# Patient Record
Sex: MALE | Race: BLACK OR AFRICAN AMERICAN | NOT HISPANIC OR LATINO | URBAN - METROPOLITAN AREA
[De-identification: names, ages, dates, MRNs, and addresses within clinical notes are randomized per-mention and may not be internally consistent; named-entity substitution may affect disease eponyms.]

---

## 2020-04-20 ENCOUNTER — HOSPITAL ENCOUNTER (INPATIENT)
Facility: HOSPITAL | Age: 55
LOS: 1 days | Discharge: HOME | DRG: 064 | End: 2020-04-22
Attending: EMERGENCY MEDICINE | Admitting: PSYCHIATRY & NEUROLOGY
Payer: OTHER GOVERNMENT

## 2020-04-20 DIAGNOSIS — I63.9 CEREBROVASCULAR ACCIDENT (CVA), UNSPECIFIED MECHANISM: Primary | ICD-10-CM

## 2020-04-20 DIAGNOSIS — I63.9 STROKE: ICD-10-CM

## 2020-04-20 PROBLEM — R29.818 TRANSIENT NEUROLOGICAL SYMPTOMS: Status: ACTIVE | Noted: 2020-04-20

## 2020-04-20 PROBLEM — E78.5 HYPERLIPIDEMIA: Status: ACTIVE | Noted: 2020-04-20

## 2020-04-20 PROBLEM — I63.50 RIGHT PONTINE STROKE: Status: ACTIVE | Noted: 2020-04-20

## 2020-04-20 PROBLEM — I10 ESSENTIAL HYPERTENSION: Status: ACTIVE | Noted: 2020-04-20

## 2020-04-20 LAB
ALBUMIN SERPL BCP-MCNC: 4.6 G/DL (ref 3.5–5.2)
ALP SERPL-CCNC: 206 U/L (ref 55–135)
ALT SERPL W/O P-5'-P-CCNC: 69 U/L (ref 10–44)
ANION GAP SERPL CALC-SCNC: 9 MMOL/L (ref 8–16)
AST SERPL-CCNC: 42 U/L (ref 10–40)
BASOPHILS # BLD AUTO: 0.03 K/UL (ref 0–0.2)
BASOPHILS NFR BLD: 0.6 % (ref 0–1.9)
BILIRUB SERPL-MCNC: 0.5 MG/DL (ref 0.1–1)
BILIRUB UR QL STRIP: NEGATIVE
BUN SERPL-MCNC: 13 MG/DL (ref 6–20)
CALCIUM SERPL-MCNC: 10.6 MG/DL (ref 8.7–10.5)
CHLORIDE SERPL-SCNC: 99 MMOL/L (ref 95–110)
CHOLEST SERPL-MCNC: 211 MG/DL (ref 120–199)
CHOLEST/HDLC SERPL: 4.2 {RATIO} (ref 2–5)
CLARITY UR REFRACT.AUTO: CLEAR
CO2 SERPL-SCNC: 30 MMOL/L (ref 23–29)
COLOR UR AUTO: YELLOW
CREAT SERPL-MCNC: 1.1 MG/DL (ref 0.5–1.4)
CREAT SERPL-MCNC: 1.2 MG/DL (ref 0.5–1.4)
DIFFERENTIAL METHOD: NORMAL
EOSINOPHIL # BLD AUTO: 0.1 K/UL (ref 0–0.5)
EOSINOPHIL NFR BLD: 1.4 % (ref 0–8)
ERYTHROCYTE [DISTWIDTH] IN BLOOD BY AUTOMATED COUNT: 12.7 % (ref 11.5–14.5)
EST. GFR  (AFRICAN AMERICAN): >60 ML/MIN/1.73 M^2
EST. GFR  (NON AFRICAN AMERICAN): >60 ML/MIN/1.73 M^2
ESTIMATED AVG GLUCOSE: 100 MG/DL (ref 68–131)
GLUCOSE SERPL-MCNC: 97 MG/DL (ref 70–110)
GLUCOSE UR QL STRIP: NEGATIVE
HBA1C MFR BLD HPLC: 5.1 % (ref 4–5.6)
HCT VFR BLD AUTO: 51.6 % (ref 40–54)
HDLC SERPL-MCNC: 50 MG/DL (ref 40–75)
HDLC SERPL: 23.7 % (ref 20–50)
HGB BLD-MCNC: 16.7 G/DL (ref 14–18)
HGB UR QL STRIP: NEGATIVE
IMM GRANULOCYTES # BLD AUTO: 0.01 K/UL (ref 0–0.04)
IMM GRANULOCYTES NFR BLD AUTO: 0.2 % (ref 0–0.5)
INR PPP: 1.1 (ref 0.8–1.2)
KETONES UR QL STRIP: NEGATIVE
LDLC SERPL CALC-MCNC: 146 MG/DL (ref 63–159)
LEUKOCYTE ESTERASE UR QL STRIP: NEGATIVE
LYMPHOCYTES # BLD AUTO: 1.6 K/UL (ref 1–4.8)
LYMPHOCYTES NFR BLD: 32.7 % (ref 18–48)
MCH RBC QN AUTO: 30.7 PG (ref 27–31)
MCHC RBC AUTO-ENTMCNC: 32.4 G/DL (ref 32–36)
MCV RBC AUTO: 95 FL (ref 82–98)
MONOCYTES # BLD AUTO: 0.5 K/UL (ref 0.3–1)
MONOCYTES NFR BLD: 10.2 % (ref 4–15)
NEUTROPHILS # BLD AUTO: 2.7 K/UL (ref 1.8–7.7)
NEUTROPHILS NFR BLD: 54.9 % (ref 38–73)
NITRITE UR QL STRIP: NEGATIVE
NONHDLC SERPL-MCNC: 161 MG/DL
NRBC BLD-RTO: 0 /100 WBC
PH UR STRIP: 6 [PH] (ref 5–8)
PLATELET # BLD AUTO: 176 K/UL (ref 150–350)
PMV BLD AUTO: 10.2 FL (ref 9.2–12.9)
POTASSIUM SERPL-SCNC: 4.3 MMOL/L (ref 3.5–5.1)
PROT SERPL-MCNC: 9.6 G/DL (ref 6–8.4)
PROT UR QL STRIP: NEGATIVE
PROTHROMBIN TIME: 10.9 SEC (ref 9–12.5)
RBC # BLD AUTO: 5.44 M/UL (ref 4.6–6.2)
SAMPLE: NORMAL
SARS-COV-2 RDRP RESP QL NAA+PROBE: NEGATIVE
SODIUM SERPL-SCNC: 138 MMOL/L (ref 136–145)
SP GR UR STRIP: 1.01 (ref 1–1.03)
TRIGL SERPL-MCNC: 75 MG/DL (ref 30–150)
TSH SERPL DL<=0.005 MIU/L-ACNC: 0.57 UIU/ML (ref 0.4–4)
URN SPEC COLLECT METH UR: NORMAL
WBC # BLD AUTO: 4.89 K/UL (ref 3.9–12.7)

## 2020-04-20 PROCEDURE — 99900035 HC TECH TIME PER 15 MIN (STAT)

## 2020-04-20 PROCEDURE — 84443 ASSAY THYROID STIM HORMONE: CPT

## 2020-04-20 PROCEDURE — 93010 ELECTROCARDIOGRAM REPORT: CPT | Mod: ,,, | Performed by: INTERNAL MEDICINE

## 2020-04-20 PROCEDURE — 99291 CRITICAL CARE FIRST HOUR: CPT | Mod: 25

## 2020-04-20 PROCEDURE — 80053 COMPREHEN METABOLIC PANEL: CPT

## 2020-04-20 PROCEDURE — G0378 HOSPITAL OBSERVATION PER HR: HCPCS

## 2020-04-20 PROCEDURE — 99285 PR EMERGENCY DEPT VISIT,LEVEL V: ICD-10-PCS | Mod: ,,, | Performed by: PSYCHIATRY & NEUROLOGY

## 2020-04-20 PROCEDURE — 93010 EKG 12-LEAD: ICD-10-PCS | Mod: ,,, | Performed by: INTERNAL MEDICINE

## 2020-04-20 PROCEDURE — 82962 GLUCOSE BLOOD TEST: CPT

## 2020-04-20 PROCEDURE — 99291 CRITICAL CARE FIRST HOUR: CPT | Mod: ,,, | Performed by: EMERGENCY MEDICINE

## 2020-04-20 PROCEDURE — 85610 PROTHROMBIN TIME: CPT

## 2020-04-20 PROCEDURE — 96372 THER/PROPH/DIAG INJ SC/IM: CPT

## 2020-04-20 PROCEDURE — 83036 HEMOGLOBIN GLYCOSYLATED A1C: CPT

## 2020-04-20 PROCEDURE — 82565 ASSAY OF CREATININE: CPT

## 2020-04-20 PROCEDURE — 99291 PR CRITICAL CARE, E/M 30-74 MINUTES: ICD-10-PCS | Mod: ,,, | Performed by: EMERGENCY MEDICINE

## 2020-04-20 PROCEDURE — 80061 LIPID PANEL: CPT

## 2020-04-20 PROCEDURE — 81003 URINALYSIS AUTO W/O SCOPE: CPT

## 2020-04-20 PROCEDURE — 63600175 PHARM REV CODE 636 W HCPCS: Performed by: STUDENT IN AN ORGANIZED HEALTH CARE EDUCATION/TRAINING PROGRAM

## 2020-04-20 PROCEDURE — 93005 ELECTROCARDIOGRAM TRACING: CPT

## 2020-04-20 PROCEDURE — U0002 COVID-19 LAB TEST NON-CDC: HCPCS

## 2020-04-20 PROCEDURE — 36000 PLACE NEEDLE IN VEIN: CPT

## 2020-04-20 PROCEDURE — 25000003 PHARM REV CODE 250: Performed by: STUDENT IN AN ORGANIZED HEALTH CARE EDUCATION/TRAINING PROGRAM

## 2020-04-20 PROCEDURE — 85025 COMPLETE CBC W/AUTO DIFF WBC: CPT

## 2020-04-20 PROCEDURE — 99285 EMERGENCY DEPT VISIT HI MDM: CPT | Mod: ,,, | Performed by: PSYCHIATRY & NEUROLOGY

## 2020-04-20 PROCEDURE — 36415 COLL VENOUS BLD VENIPUNCTURE: CPT

## 2020-04-20 RX ORDER — SODIUM CHLORIDE 0.9 % (FLUSH) 0.9 %
10 SYRINGE (ML) INJECTION
Status: DISCONTINUED | OUTPATIENT
Start: 2020-04-20 | End: 2020-04-22 | Stop reason: HOSPADM

## 2020-04-20 RX ORDER — ASPIRIN 81 MG/1
81 TABLET ORAL DAILY
Status: DISCONTINUED | OUTPATIENT
Start: 2020-04-20 | End: 2020-04-22 | Stop reason: HOSPADM

## 2020-04-20 RX ORDER — HEPARIN SODIUM 5000 [USP'U]/ML
5000 INJECTION, SOLUTION INTRAVENOUS; SUBCUTANEOUS EVERY 8 HOURS
Status: DISCONTINUED | OUTPATIENT
Start: 2020-04-20 | End: 2020-04-22 | Stop reason: HOSPADM

## 2020-04-20 RX ORDER — LABETALOL HYDROCHLORIDE 5 MG/ML
10 INJECTION, SOLUTION INTRAVENOUS
Status: DISCONTINUED | OUTPATIENT
Start: 2020-04-20 | End: 2020-04-20

## 2020-04-20 RX ORDER — CLOPIDOGREL BISULFATE 75 MG/1
75 TABLET ORAL DAILY
Status: DISCONTINUED | OUTPATIENT
Start: 2020-04-21 | End: 2020-04-22 | Stop reason: HOSPADM

## 2020-04-20 RX ORDER — ATORVASTATIN CALCIUM 10 MG/1
40 TABLET, FILM COATED ORAL DAILY
Status: DISCONTINUED | OUTPATIENT
Start: 2020-04-21 | End: 2020-04-20

## 2020-04-20 RX ORDER — ATORVASTATIN CALCIUM 40 MG/1
40 TABLET, FILM COATED ORAL DAILY
Status: DISCONTINUED | OUTPATIENT
Start: 2020-04-20 | End: 2020-04-22 | Stop reason: HOSPADM

## 2020-04-20 RX ORDER — CLOPIDOGREL 300 MG/1
300 TABLET, FILM COATED ORAL ONCE
Status: COMPLETED | OUTPATIENT
Start: 2020-04-20 | End: 2020-04-20

## 2020-04-20 RX ORDER — LABETALOL HCL 20 MG/4 ML
10 SYRINGE (ML) INTRAVENOUS
Status: DISCONTINUED | OUTPATIENT
Start: 2020-04-20 | End: 2020-04-22 | Stop reason: HOSPADM

## 2020-04-20 RX ADMIN — CLOPIDOGREL BISULFATE 300 MG: 300 TABLET, FILM COATED ORAL at 03:04

## 2020-04-20 RX ADMIN — ASPIRIN 81 MG: 81 TABLET, COATED ORAL at 03:04

## 2020-04-20 RX ADMIN — ATORVASTATIN CALCIUM 40 MG: 40 TABLET, FILM COATED ORAL at 03:04

## 2020-04-20 RX ADMIN — HEPARIN SODIUM 5000 UNITS: 5000 INJECTION INTRAVENOUS; SUBCUTANEOUS at 09:04

## 2020-04-20 NOTE — ASSESSMENT & PLAN NOTE
Stroke risk factor  -Likely hypertensive in response to infarct  -Permissive HTN to SBP <220 x24 hrs followed by gradual taper.

## 2020-04-20 NOTE — ASSESSMENT & PLAN NOTE
Mr. Tejeda is a 54 yo male w/ PMH significant for HTN who presented to [facility] [place]20.  Stroke code called for concern of LUE, LLE numbness and dysarthria.      Overall, most concerned for hypertensive emergency giving waxing/waning symptoms over the last few days, but symptoms he is currently experiencing have been present since he awoke at 0800 today.    VAN negative.  Not a candidate for rtPA given LKN.    Recommendations  -MRI Brain w/o contrast, MRA head/neck w/o  -Permissive HTN to SBP <220 pending MRI.      -IF MRI positive for stroke, will continue with permissive HTN to SBP <220 and possibly admit to stroke (if BP able to be controlled safely on floor).    -IF MRI negative for stroke, would recommend treating as hypertensive emergency.

## 2020-04-20 NOTE — ASSESSMENT & PLAN NOTE
Mr. Tejeda is a 56 yo male w/ PMH significant for HTN who [place] [facility]  Stroke code called for concern of LUE, LLE numbness and dysarthria.  Found to have R lateral pontine stroke.    Plan  Antithrombotics for secondary stroke prevention: DAPT x30 days then ASA 81 monotherapy there after    Statins for secondary stroke prevention and hyperlipidemia, if present:   START atorvastatin 40 mg Qdaily, LDL pending    Aggressive risk factor modification: HTN     Rehab efforts: The patient has been evaluated by a stroke team provider and the therapy needs have been fully considered based off the presenting complaints and exam findings. The following therapy evaluations are needed: PT evaluate and treat, OT evaluate and treat, SLP evaluate and treat, PM&R evaluate for appropriate placement    Diagnostics ordered/pending: HgbA1C to assess blood glucose levels, Lipid Profile to assess cholesterol levels, TTE to assess cardiac function/status     VTE prophylaxis: Heparin 5000 units SQ every 8 hours Place SCDs for mechanical ppx.    BP parameters: Infarct: No intervention, SBP <220    Admit to stroke floor pending COVID-19 testing.

## 2020-04-20 NOTE — NURSING
Pt arrived from ED via stretcher, pt is aaox4, pt's BP is 215/125, pt has no complaints of any headache or pain, NIH assessment performed, safety precautions maintained, call light in reach.

## 2020-04-20 NOTE — HPI
Mr. Tejeda is a 54 yo male w/ PMH significant for HTN who [place] [facility] [place].  Stroke code called for concern of LUE, LLE numbness and dysarthria.    Pt reports that he began having episodes of the above symptoms beginning 3 days (Saturday) prior to presentation.  States that he awoke with these symptoms at 0800 the day of presentation and wife encouraged him to come to ED for evaluation.  In ED, SBP in 220s.  He is not sure what his BP typically runs.  He has not been on any antihypertensives in 2 years, per pt report.  Drinks a beer or less most days after work ().  Denies cigarette use, recreational drug use.  Occasional ASA use for pain, does not take on a regular basis.

## 2020-04-20 NOTE — SUBJECTIVE & OBJECTIVE
No past medical history on file.  No past surgical history on file.  No family history on file.  Social History     Tobacco Use    Smoking status: Not on file   Substance Use Topics    Alcohol use: Not on file    Drug use: Not on file     Review of patient's allergies indicates:  Allergies not on file    Medications: I have reviewed the current medication administration record.      (Not in a hospital admission)    Review of Systems   Constitutional: Negative for fever.   HENT: Negative for facial swelling.    Eyes: Negative for redness.   Respiratory: Negative for cough.    Cardiovascular: Negative for leg swelling.   Gastrointestinal: Negative for vomiting.   Skin: Negative for rash.   Allergic/Immunologic: Negative for immunocompromised state.   Neurological: Positive for speech difficulty and numbness. Negative for facial asymmetry.   Psychiatric/Behavioral: Negative for confusion.     Objective:     Vital Signs (Most Recent):  Temp: 99.3 °F (37.4 °C) (04/20/20 1311)  Pulse: 94 (04/20/20 1315)  Resp: 18 (04/20/20 1311)  BP: (!) 222/127(Suhail at bedside aware) (04/20/20 1315)  SpO2: 97 % (04/20/20 1311)    Vital Signs Range (Last 24H):  Temp:  [99.3 °F (37.4 °C)]   Pulse:  [83-94]   Resp:  [18]   BP: (199-222)/(112-127)   SpO2:  [97 %]     Physical Exam   Constitutional: He is oriented to person, place, and time. He appears well-developed. No distress.   HENT:   Head: Normocephalic.   Eyes: Conjunctivae are normal.   Cardiovascular: Normal rate, regular rhythm and normal heart sounds. Exam reveals no friction rub.   No murmur heard.  Pulmonary/Chest: Effort normal and breath sounds normal. No respiratory distress. He has no wheezes.   Abdominal: Soft. Bowel sounds are normal. He exhibits no distension. There is no tenderness.   Musculoskeletal: He exhibits no edema.   Neurological: He is alert and oriented to person, place, and time.   Skin: Skin is warm and dry.   Psychiatric: He has a normal mood and  affect.       Neurological Exam:   LOC: alert  Language: slight receptive aphasia (missed hammock)  Articulation: Dysarthria (somewhat obscured by surgical mask)  Orientation: oriented to month/year, day.  Visual Fields: Full  EOM (CN III, IV, VI): Full/intact  Pupils (CN II, III): PERRL  Facial Sensation (CN V): Normal  Facial Movement (CN VII): Symmetric facial expression    Motor: 5/5 all extremities  Cebellar: finger/nose intact b/l  Sensation: Intact to light touch in all extremities, reports subjective tingling in LUE, LLE      Laboratory:  No results found for this or any previous visit (from the past 12 hour(s)).        Diagnostic Results:    Brain imagin20 CTH: Per independent resident review and interpretation,  No acute infarct, hemorrhage, nor mass effect.  Remote lacunar infarct L anterior thalamus.  Prominent L vert and basilar, suspect unlikely to be hyperdense vessel sign.              Vessel Imaging:  No vessel imaging available    Cardiac Evaluation:   20 EKG: Per independent resident review and interpretation,  NSR.  QTc 462.

## 2020-04-20 NOTE — SUBJECTIVE & OBJECTIVE
No past medical history on file.  No past surgical history on file.  No family history on file.  Social History     Tobacco Use    Smoking status: Not on file   Substance Use Topics    Alcohol use: Not on file    Drug use: Not on file     Review of patient's allergies indicates:  No Known Allergies    Medications: I have reviewed the current medication administration record.      (Not in a hospital admission)    Review of Systems   Constitutional: Negative for fever.   HENT: Negative for facial swelling.    Eyes: Negative for redness.   Respiratory: Negative for cough.    Cardiovascular: Negative for leg swelling.   Gastrointestinal: Negative for vomiting.   Skin: Negative for rash.   Allergic/Immunologic: Negative for immunocompromised state.   Neurological: Positive for speech difficulty and numbness. Negative for facial asymmetry.   Psychiatric/Behavioral: Negative for confusion.     Objective:     Vital Signs (Most Recent):  Temp: 99.3 °F (37.4 °C) (04/20/20 1311)  Pulse: 87 (04/20/20 1400)  Resp: 18 (04/20/20 1400)  BP: (!) 174/100 (04/20/20 1400)  SpO2: 98 % (04/20/20 1400)    Vital Signs Range (Last 24H):  Temp:  [99.3 °F (37.4 °C)]   Pulse:  [83-94]   Resp:  [18-19]   BP: (174-222)/(100-133)   SpO2:  [97 %-100 %]     Physical Exam   Constitutional: He is oriented to person, place, and time. He appears well-developed. No distress.   HENT:   Head: Normocephalic.   Eyes: Conjunctivae are normal.   Cardiovascular: Normal rate, regular rhythm and normal heart sounds. Exam reveals no friction rub.   No murmur heard.  Pulmonary/Chest: Effort normal and breath sounds normal. No respiratory distress. He has no wheezes.   Abdominal: Soft. Bowel sounds are normal. He exhibits no distension. There is no tenderness.   Musculoskeletal: He exhibits no edema.   Neurological: He is alert and oriented to person, place, and time.   Skin: Skin is warm and dry.   Psychiatric: He has a normal mood and affect.        Neurological Exam:   LOC: alert  Language: slight receptive aphasia (missed hammock)  Articulation: Dysarthria (somewhat obscured by surgical mask)  Orientation: oriented to month/year, day.  Visual Fields: Full  EOM (CN III, IV, VI): Full/intact  Pupils (CN II, III): PERRL  Facial Sensation (CN V): Normal  Facial Movement (CN VII): Symmetric facial expression    Motor: 5/5 all extremities  Cebellar: finger/nose intact b/l  Sensation: Intact to light touch in all extremities, reports subjective tingling in LUE, LLE      Laboratory:  Recent Results (from the past 12 hour(s))   CBC W/ AUTO DIFFERENTIAL    Collection Time: 20  1:43 PM   Result Value Ref Range    WBC 4.89 3.90 - 12.70 K/uL    RBC 5.44 4.60 - 6.20 M/uL    Hemoglobin 16.7 14.0 - 18.0 g/dL    Hematocrit 51.6 40.0 - 54.0 %    Mean Corpuscular Volume 95 82 - 98 fL    Mean Corpuscular Hemoglobin 30.7 27.0 - 31.0 pg    Mean Corpuscular Hemoglobin Conc 32.4 32.0 - 36.0 g/dL    RDW 12.7 11.5 - 14.5 %    Platelets 176 150 - 350 K/uL    MPV 10.2 9.2 - 12.9 fL    Immature Granulocytes 0.2 0.0 - 0.5 %    Gran # (ANC) 2.7 1.8 - 7.7 K/uL    Immature Grans (Abs) 0.01 0.00 - 0.04 K/uL    Lymph # 1.6 1.0 - 4.8 K/uL    Mono # 0.5 0.3 - 1.0 K/uL    Eos # 0.1 0.0 - 0.5 K/uL    Baso # 0.03 0.00 - 0.20 K/uL    nRBC 0 0 /100 WBC    Gran% 54.9 38.0 - 73.0 %    Lymph% 32.7 18.0 - 48.0 %    Mono% 10.2 4.0 - 15.0 %    Eosinophil% 1.4 0.0 - 8.0 %    Basophil% 0.6 0.0 - 1.9 %    Differential Method Automated    Protime-INR    Collection Time: 20  1:43 PM   Result Value Ref Range    Prothrombin Time 10.9 9.0 - 12.5 sec    INR 1.1 0.8 - 1.2   TSH    Collection Time: 20  1:43 PM   Result Value Ref Range    TSH 0.571 0.400 - 4.000 uIU/mL   ISTAT CREATININE    Collection Time: 20  1:53 PM   Result Value Ref Range    POC Creatinine 1.1 0.5 - 1.4 mg/dL    Sample unknown            Diagnostic Results:    Brain imagin20 CTH: Per independent resident  review and interpretation,  No acute infarct, hemorrhage, nor mass effect.  Remote lacunar infarct L anterior thalamus.  Prominent L vert and basilar, suspect unlikely to be hyperdense vessel sign.            20 MRI Brain w/o contrast: Per independent resident review and interpretation,  Acute infarct in R lateral david, long circumferential artery distribution.          Vessel Imagin20 MRA Brain and neck w/o: Per independent resident review and interpretation,  No large-vessel occlusion, hemodynamically-significant stenosis, nor aneurysm visualized.        Cardiac Evaluation:   20 EKG: Per independent resident review and interpretation,  NSR.  QTc 462.

## 2020-04-20 NOTE — ED PROVIDER NOTES
Encounter Date: 4/20/2020       History     Chief Complaint   Patient presents with    Hypertension     no meds for 2 yrs, states friday started feeling bad, headache on friday but not today     HPI   55 yom with pmh/o HTN presenting with left upper and lower extremity paresthesias. On presentation, patient stroke activated for presenting symptoms and concern for possible dysarthria. Patient states that he was bed ridden approximately two days ago 2/2 transient headaches, general weakness, fatigue but reports feeling much better yesterday and today. He attributed his symptoms to a difficult shift on 4/17 where he works as a . He presented to the emergency department today at the behest of his wife who was concerned about his on-going extremity paresthesias that began at approximately 0800 upon waking. Denies current headaches, new or recent visual changes, extremity numbness/weakness, difficulty speaking, confusion, subjective fevers/chills, chest and/or back pain. Reports good PO intake and normal urinary and bowel habits. Of note, patient states that he has not taken his home anti-hypertensive medications ion approximately two years.    Review of patient's allergies indicates:  No Known Allergies  Past Medical History:   Diagnosis Date    Hypertension      History reviewed. No pertinent surgical history.  History reviewed. No pertinent family history.  Social History     Tobacco Use    Smoking status: Never Smoker    Smokeless tobacco: Never Used   Substance Use Topics    Alcohol use: Not on file    Drug use: Not on file     Review of Systems   Constitutional: Positive for fatigue. Negative for fever.   HENT: Negative for sore throat.    Respiratory: Negative for shortness of breath.    Cardiovascular: Negative for chest pain.   Gastrointestinal: Negative for nausea.   Genitourinary: Negative for dysuria.   Musculoskeletal: Negative for back pain.   Skin: Negative for rash.   Neurological:  Positive for weakness and headaches. Negative for dizziness, syncope, facial asymmetry and speech difficulty.   Hematological: Does not bruise/bleed easily.       Physical Exam     Initial Vitals [04/20/20 1311]   BP Pulse Resp Temp SpO2   (!) 199/112 83 18 99.3 °F (37.4 °C) 97 %      MAP       --         Physical Exam    Nursing note and vitals reviewed.  Constitutional:   Well-nourished, well-appearing male resting comfortably in bed. In no obvious acute distress   HENT:   Head: Normocephalic and atraumatic.   Right Ear: External ear normal.   Left Ear: External ear normal.   Eyes: Conjunctivae and EOM are normal. Pupils are equal, round, and reactive to light.   Neck: Neck supple.   Cardiovascular: Normal rate, regular rhythm, normal heart sounds and intact distal pulses.   Pulmonary/Chest: Breath sounds normal. No respiratory distress. He has no wheezes. He has no rhonchi. He has no rales.   Abdominal: Soft. He exhibits no distension. There is no tenderness. There is no rebound and no guarding.   Neurological: GCS score is 15. GCS eye subscore is 4. GCS verbal subscore is 5. GCS motor subscore is 6.   Alert and oriented x4. No obvious focal neurological deficits. VAN negative. Cranial nerves 2 - 12 grossly intact bilaterally. Pupil reactivity and ocular movement normal bilaterally. No evidence of pronator drift. Subjective tingling sensation over left lateral forearm, lateral thigh. Strength 5/5 in bilateral upper and lower extremities.   Skin: Skin is warm. Capillary refill takes less than 2 seconds. No rash noted.   Psychiatric: He has a normal mood and affect.         ED Course   Procedures  Labs Reviewed   SARS-COV-2 RNA AMPLIFICATION, QUAL   CBC W/ AUTO DIFFERENTIAL   PROTIME-INR   TSH   URINALYSIS    Narrative:     If patient is unable to provide a clean catch specimen due to  impairments such as mobility or cognition, obtain straight  cath specimen.   HEMOGLOBIN A1C   HEMOGLOBIN A1C    Narrative:      ADD-ON [facility] #[id number] PER KAYA COOK MD 16:44  04/20/2020    POCT GLUCOSE, HAND-HELD DEVICE   ISTAT CREATININE        ECG Results          ECG 12 lead (Final result)  Result time 04/21/20 11:45:59    Final result by Interface, Lab In [facility] (04/21/20 11:45:59)                 Narrative:    Test Reason : I63.9,    Vent. Rate : 098 BPM     Atrial Rate : 098 BPM     P-R Int : 154 ms          QRS Dur : 084 ms      QT Int : 362 ms       P-R-T Axes : 067 099 016 degrees     QTc Int : 462 ms    Normal sinus rhythm  Rightward axis  Nonspecific T wave abnormality  Abnormal ECG  No previous ECGs available  Confirmed by ELIGIO BERMAN MD ([id number]) on 4/21/2020 11:45:48 AM    Referred By: RICK   SELF           Confirmed By:ELIGIO BERMAN MD                            Imaging Results          X-Ray Chest AP Portable (Final result)  Result time 04/20/20 16:10:29    Final result by Tri Mosqueda MD (04/20/20 16:10:29)                 Impression:      No acute abnormality.      Electronically signed by: Tri Mosqueda MD  Date:    04/20/2020  Time:    16:10             Narrative:    EXAMINATION:  XR CHEST AP PORTABLE    CLINICAL HISTORY:  Stroke;    TECHNIQUE:  Single frontal view of the chest was performed.    COMPARISON:  None    FINDINGS:  The lungs are clear, with normal appearance of pulmonary vasculature and no pleural effusion or pneumothorax.    The cardiac silhouette is normal in size. The hilar and mediastinal contours are unremarkable.    Bones are intact.                               MRI Brain Without Contrast (Final result)  Result time 04/20/20 15:25:38    Final result by Dmitriy Turk DO (04/20/20 15:25:38)                 Impression:      Mild moderate sized signal abnormality lateral aspect the right david as detailed above most suggestive for recent infarction.    Superimposed small to punctate size foci of T2 FLAIR signal hyperintensity elsewhere supratentorial white matter which are  nonspecific and may represent age advanced chronic ischemic change.    Clinical correlation and follow-up advised.      Electronically signed by: Dmitriy Turk DO  Date:    04/20/2020  Time:    15:25             Narrative:    EXAMINATION:  MRI BRAIN WITHOUT CONTRAST    CLINICAL HISTORY:  Stroke;    TECHNIQUE:  Sagittal and axial T1, axial T2, axial FLAIR, axial gradient and axial diffusion imaging of the whole brain without contrast.    COMPARISON:  CT head 04/20/2020    FINDINGS:  There is a mild moderate size regions of restricted diffusion within the lateral aspect of the right david with corresponding T2 flair signal hyperintensity concerning for recent infarction.  Demyelination or vasculopathy to be considered in the differential.  No significant mass effect or evidence for hemorrhagic conversion.  There is superimposed several scattered small to punctate foci of T2 FLAIR signal hyperintensities in the supratentorial white matter which are nonspecific and may be sequela of age advanced chronic microvascular ischemic change.  Ventricles are normal without hydrocephalus.  No abnormal intra or extra-axial fluid collection.  Major intracranial T2 flow voids are present.    .  Case discussed with Dr. Gray on 04/20/2020 at 14 42.                               MRA Brain without contrast (Final result)  Result time 04/20/20 15:50:56    Final result by Dmitriy Turk DO (04/20/20 15:50:56)                 Impression:      MRA head: Unremarkable MRA of the head specifically without evidence for focal stenosis or definite intracranial aneurysm.    MRA neck: Unremarkable motion distorted MRA of the neck as detailed above without evidence for significant focal stenosis or occlusion.      Electronically signed by: Dmitriy Turk DO  Date:    04/20/2020  Time:    15:50             Narrative:    EXAMINATION:  MRA BRAIN WITHOUT CONTRAST; MRA NECK WITHOUT CONTRAST    CLINICAL HISTORY:  Stroke;    TECHNIQUE:  noncontrast 3-D  time of flight MRA head and noncontrast 2-D and 3D  time-of-flight MRA neck.    COMPARISON:  None    FINDINGS:  MRA head:    Anterior circulation:  The bilateral distal cervical, Magy, cavernous and supraclinoid segments of the ICA's and the visualized bilateral anterior and middle cerebral arteries are patent without evidence for significant focal stenosis or aneurysm.    Posterior circulation: The distal left vertebral artery is dominant.  The distal vertebral arteries, basilar artery and posterior cerebral arteries are patent without evidence for significant focal stenosis or aneurysm.    MRA neck: Study distorted by patient motion.  Arch and origin the great vessels partially excluded from the field of view..  The origins of the vertebral arteries from the subclavian arteries are within normal limits.  The vertebral arteries are grossly patent throughout their course.    The bilateral common carotid arteries, carotid bifurcations and extracranial portions of the internal carotid arteries are patent without evidence for significant focal stenosis allowing for motion.    Less than 50% proximal ICA stenosis by NASCET criteria.                               MRA Neck without contrast (Final result)  Result time 04/20/20 15:50:56    Final result by Dmitriy Turk DO (04/20/20 15:50:56)                 Impression:      MRA head: Unremarkable MRA of the head specifically without evidence for focal stenosis or definite intracranial aneurysm.    MRA neck: Unremarkable motion distorted MRA of the neck as detailed above without evidence for significant focal stenosis or occlusion.      Electronically signed by: Dmitriy Turk DO  Date:    04/20/2020  Time:    15:50             Narrative:    EXAMINATION:  MRA BRAIN WITHOUT CONTRAST; MRA NECK WITHOUT CONTRAST    CLINICAL HISTORY:  Stroke;    TECHNIQUE:  noncontrast 3-D time of flight MRA head and noncontrast 2-D and 3D  time-of-flight MRA  neck.    COMPARISON:  None    FINDINGS:  MRA head:    Anterior circulation:  The bilateral distal cervical, Magy, cavernous and supraclinoid segments of the ICA's and the visualized bilateral anterior and middle cerebral arteries are patent without evidence for significant focal stenosis or aneurysm.    Posterior circulation: The distal left vertebral artery is dominant.  The distal vertebral arteries, basilar artery and posterior cerebral arteries are patent without evidence for significant focal stenosis or aneurysm.    MRA neck: Study distorted by patient motion.  Arch and origin the great vessels partially excluded from the field of view..  The origins of the vertebral arteries from the subclavian arteries are within normal limits.  The vertebral arteries are grossly patent throughout their course.    The bilateral common carotid arteries, carotid bifurcations and extracranial portions of the internal carotid arteries are patent without evidence for significant focal stenosis allowing for motion.    Less than 50% proximal ICA stenosis by NASCET criteria.                               CT Head Without Contrast (Final result)  Result time 04/20/20 14:12:41    Final result by Dmitriy Turk DO (04/20/20 14:12:41)                 Impression:      Unremarkable noncontrast CT head specifically without evidence for acute intracranial hemorrhage or sulcal effacement to suggest large territory recent infarction..  Clinical correlation and further evaluation with MRI as warranted if patient compatible.    Electronically signed by resident: Sudhir Rodgers  Date:    04/20/2020  Time:    13:44    Electronically signed by: Dmitriy Turk DO  Date:    04/20/2020  Time:    14:12             Narrative:    EXAMINATION:  CT HEAD WITHOUT CONTRAST    CLINICAL HISTORY:  Stroke;    TECHNIQUE:  Multiple sequential 5 mm axial images of the head without contrast.  Coronal and sagittal reformatted imaging from the axial  "acquisition.    COMPARISON:  None    None.    FINDINGS:  There is no evidence for acute intracranial hemorrhage or sulcal effacement.  The ventricles are normal in size without hydrocephalus.  There is no midline shift or mass effect.  There is mild moderate patchy ethmoid air cell opacification with trace mucosal thickening visualized maxillary antra.    .                                 Medical Decision Making:   History:   Old Medical Records: I decided to obtain old medical records.  Old Records Summarized: other records.       <> Summary of Records: Never been treated here previously.  Clinical Tests:   Lab Tests: Ordered and Reviewed  Radiological Study: Ordered and Reviewed  Medical Tests: Ordered and Reviewed  Other:   I have discussed this case with another health care provider.       <> Summary of the Discussion: Vascular neurology         APC / Resident Notes:   This is an emergent evaluation of a 55 yom presenting with concerns for acute stroke. On presentation, GCS 15, patient afebrile, hypertensive to 220s systolic, sating well on room air. Patient stroke activated for concerning subjective sensation of left upper and lower extremity "tingling", as well as possible dysarthria. No obvious focal neurological deficits appreciated on rapid bedside physical exam- VAN negative, NIH stroke scale 3. Noncontrast CTH with no evidence of acute intracranial hemorrhage or large infarct. Prior to MR scans, BP improved to 170s systolic, reported improvement in extremity sensations. On subsequent MRI brain, evidence of right lateral pontine signal hyperintensity concerning for recent infarction. Patient subsequently admitted to vascular neurology service for further management.          Attending Attestation:   Physician Attestation Statement for Resident:  As the supervising MD   Physician Attestation Statement: I have personally seen and examined this patient.   I agree with the above history. -:   As the " supervising MD I agree with the above PE.    As the supervising MD I agree with the above treatment, course, plan, and disposition.   -:     Hypertensive.  Afebrile.  Here with left-sided numbness and maybe some difficulty with speech.  Family had reported to nursing staff of some weakness as well but patient denies this.  I think he is minimizing his symptoms.  CT head negative for ICH or stroke.  MRI obtained.  MRI with right pontine stroke.  Discussed with vascular neurology who admitted patient.      I have reviewed and agree with the residents interpretation of the following: lab data, CT scans and EKG.  I have reviewed the following: old records at this facility.        Attending Critical Care:   Critical Care Times:   ==============================================================  · Total Critical Care Time - exclusive of procedural time: 30 minutes.  ==============================================================  Critical care was necessary to treat or prevent imminent or life-threatening deterioration of the following conditions: stroke.   Critical care was time spent personally by me on the following activities: obtaining history from patient or relative, examination of patient, ordering lab, x-rays, and/or EKG, review of old charts, development of treatment plan with patient or relative, ordering and performing treatments and interventions, evaluation of patient's response to treatment, discussion with consultants, interpretation of cardiac measurements and re-evaluation of patient's conition.   Critical Care Condition: potentially life-threatening                             Clinical Impression:       ICD-10-CM ICD-9-CM   1. Cerebrovascular accident (CVA), unspecified mechanism I63.9 434.91   2. Stroke I63.9 434.91             ED Disposition Condition    Observation                           Luiz Roberto MD  Resident  04/20/20 [id number]       Sylvester Gray MD  04/22/20 1316

## 2020-04-20 NOTE — ED NOTES
LOC: The patient is awake, alert and aware of environment with an appropriate affect, the patient is oriented x 3 and speaking appropriately.  APPEARANCE: Patient resting comfortably and in no acute distress, patient is clean and well groomed, patient's clothing is properly fastened.  SKIN: The skin is warm and dry, color consistent with ethnicity, patient has normal skin turgor and moist mucus membranes, skin intact, no breakdown or bruising noted.  MUSCULOSKELETAL: Patient moving all extremities spontaneously, no obvious swelling or deformities noted.  RESPIRATORY: Airway is open and patent, respirations are spontaneous, patient has a normal effort and rate, no accessory muscle use noted  ABDOMEN: Soft and non tender to palpation, no distention noted    Patient states since Saturday he has not been feeling well, patient states he has been getting tingling in his left arm and left leg on and off since then, patient states he used to be on BP medication two years ago but hasnt been on anything since then, see neuro flowsheet for full neuro assessment, cardiac monitoring in progress, will continue to monitor

## 2020-04-20 NOTE — CONSULTS
[facility]  Vascular Neurology  Comprehensive Stroke Center  Consult Note    Inpatient consult to Vascular (Stroke) Neurology  Consult performed by: Marino Randolph MD  Consult ordered by: Luiz Roberto MD        Assessment/Plan:     Patient is a 55 y.o. year old male with:    * Transient neurological symptoms  Mr. Tejeda is a 56 yo male w/ PMH significant for HTN who presented to [facility] ED on 4/20.  Stroke code called for concern of LUE, LLE numbness and dysarthria.      Overall, most concerned for hypertensive emergency giving waxing/waning symptoms over the last few days, but symptoms he is currently experiencing have been present since he awoke at 0800 today.    VAN negative.  Not a candidate for rtPA given LKN.    1400 Update: as pt going to MRI, SBP decreased to 170s and pt reporting improvement in tingling sensation.    Recommendations  -MRI Brain w/o contrast, MRA head/neck w/o  -Permissive HTN to SBP <220 pending MRI.      -IF MRI positive for stroke, will continue with permissive HTN to SBP <220 and possibly admit to stroke (if BP able to be controlled safely on floor).    -IF MRI negative for stroke, would recommend treating as hypertensive emergency and vascular neurology will sign off.    Case discussed/reviewed with vascular neurology staff, Dr. Mac.      Essential hypertension  Stroke risk factor  -See transient neurological symptoms section above        STROKE DOCUMENTATION     Acute Stroke Times   Last Known Normal Date: 04/19/20  Last Known Normal Time: (unknown)  Symptom Onset Date: 04/20/20  Symptom Onset Time: 0800(wake up)  Stroke Team Called Date: 04/20/20  Stroke Team Called Time: 1321  Stroke Team Arrival Date: 04/20/20  Stroke Team Arrival Time: 1326  CT Interpretation Time: 1330    NIH Scale:  Interval: baseline  1a. Level of Consciousness: 0-->Alert, keenly responsive  1b. LOC Questions: 0-->Answers both questions correctly  1c. LOC Commands: 0-->Performs both tasks  "correctly  2. Best Gaze: 0-->Normal  3. Visual: 0-->No visual loss  4. Facial Palsy: 0-->Normal symmetrical movements  5a. Motor Arm, Left: 0-->No drift, limb holds 90 (or 45) degrees for full 10 secs  5b. Motor Arm, Right: 0-->No drift, limb holds 90 (or 45) degrees for full 10 secs  6a. Motor Leg, Left: 0-->No drift, leg holds 30 degree position for full 5 secs  6b. Motor Leg, Right: 0-->No drift, leg holds 30 degree position for full 5 secs  7. Limb Ataxia: 0-->Absent  8. Sensory: 1-->Mild-to-moderate sensory loss, patient feels pinprick is less sharp or is dull on the affected side, or there is a loss of superficial pain with pinprick, but patient is aware of being touched(missed "hammock")  9. Best Language: 1-->Mild-to-moderate aphasia, some obvious loss of fluency or facility of comprehension, without significant limitation on ideas expressed or form of expression. Reduction of speech and/or comprehension, however, makes conversation. . . (see row details)  10. Dysarthria: 1-->Mild-to-moderate dysarthria, patient slurs at least some words and, at worst, can be understood with some difficulty(wearing mask)  11. Extinction and Inattention (formerly Neglect): 0-->No abnormality  Total (NIH Stroke Scale): 3    Modified Abel Score: 0  Berna Coma Scale:15   ABCD2 Score:    [id number] Score:   HAS -BLED Score:   ICH Score:   Hunt & Barajas Classification:       Thrombolysis Candidate? No, Out of window     Delays to Thrombolysis?  No    Interventional Revascularization Candidate?   Is the patient eligible for mechanical endovascular reperfusion (PRANAV)?  No; No large vessel occlusion      Hemorrhagic change of an Ischemic Stroke: Does this patient have an ischemic stroke with hemorrhagic changes? No     Subjective:     History of Present Illness:  Mr. Tejeda is a 56 yo male w/ PMH significant for HTN who presented to [facility] ED on 4/20.  Stroke code called for concern of LUE, LLE numbness and dysarthria.      Pt " reports that he began having episodes of the above symptoms beginning 3 days (Saturday) prior to presentation.  States that he awoke with these symptoms at 0800 the day of presentation and wife encouraged him to come to ED for evaluation.  In ED, SBP in 220s.  He is not sure what his BP typically runs.  He has not been on any antihypertensives in 2 years, per pt report.    Drinks a beer or less most days after work ().  Denies cigarette use, recreational drug use.  Occasional ASA use for pain, does not take on a regular basis.        No past medical history on file.  No past surgical history on file.  No family history on file.  Social History     Tobacco Use    Smoking status: Not on file   Substance Use Topics    Alcohol use: Not on file    Drug use: Not on file     Review of patient's allergies indicates:  Allergies not on file    Medications: I have reviewed the current medication administration record.      (Not in a hospital admission)    Review of Systems   Constitutional: Negative for fever.   HENT: Negative for facial swelling.    Eyes: Negative for redness.   Respiratory: Negative for cough.    Cardiovascular: Negative for leg swelling.   Gastrointestinal: Negative for vomiting.   Skin: Negative for rash.   Allergic/Immunologic: Negative for immunocompromised state.   Neurological: Positive for speech difficulty and numbness. Negative for facial asymmetry.   Psychiatric/Behavioral: Negative for confusion.     Objective:     Vital Signs (Most Recent):  Temp: 99.3 °F (37.4 °C) (04/20/20 1311)  Pulse: 94 (04/20/20 1315)  Resp: 18 (04/20/20 1311)  BP: (!) 222/127(Suhail at bedside aware) (04/20/20 1315)  SpO2: 97 % (04/20/20 1311)    Vital Signs Range (Last 24H):  Temp:  [99.3 °F (37.4 °C)]   Pulse:  [83-94]   Resp:  [18]   BP: (199-222)/(112-127)   SpO2:  [97 %]     Physical Exam   Constitutional: He is oriented to person, place, and time. He appears well-developed. No distress.   HENT:    Head: Normocephalic.   Eyes: Conjunctivae are normal.   Cardiovascular: Normal rate, regular rhythm and normal heart sounds. Exam reveals no friction rub.   No murmur heard.  Pulmonary/Chest: Effort normal and breath sounds normal. No respiratory distress. He has no wheezes.   Abdominal: Soft. Bowel sounds are normal. He exhibits no distension. There is no tenderness.   Musculoskeletal: He exhibits no edema.   Neurological: He is alert and oriented to person, place, and time.   Skin: Skin is warm and dry.   Psychiatric: He has a normal mood and affect.       Neurological Exam:   LOC: alert  Language: slight receptive aphasia (missed hammock)  Articulation: Dysarthria (somewhat obscured by surgical mask)  Orientation: oriented to month/year, day.  Visual Fields: Full  EOM (CN III, IV, VI): Full/intact  Pupils (CN II, III): PERRL  Facial Sensation (CN V): Normal  Facial Movement (CN VII): Symmetric facial expression    Motor: 5/5 all extremities  Cebellar: finger/nose intact b/l  Sensation: Intact to light touch in all extremities, reports subjective tingling in LUE, LLE      Laboratory:  No results found for this or any previous visit (from the past 12 hour(s)).        Diagnostic Results:    Brain imagin20 CTH: Per independent resident review and interpretation,  No acute infarct, hemorrhage, nor mass effect.  Remote lacunar infarct L anterior thalamus.  Prominent L vert and basilar, suspect unlikely to be hyperdense vessel sign.              Vessel Imaging:  No vessel imaging available    Cardiac Evaluation:   20 EKG: Per independent resident review and interpretation,  NSR.  QTc 462.          Marino Randolph MD  [facility]  Department of Vascular Neurology   [facility]

## 2020-04-20 NOTE — HOSPITAL COURSE
4/20: Presented to [facility] ED  4/21: Patient doing well on exam. NIH 0. MRI with R pontine infarct. Starting Coreg 6.25 mg BID.   04/22/2020 Coreg increased to 12.5 BID. No issues w/ the medication. Discussed management and follow up w/ patient. Stable for discharge.

## 2020-04-20 NOTE — H&P
[facility]  Vascular Neurology  Comprehensive Stroke Center  History & Physical    Consults  Assessment/Plan:     Patient is a 55 y.o. year old male with:    * Right pontine stroke  Mr. Tejeda is a 54 yo male w/ PMH significant for HTN who presented to [facility] ED on 4/20.  Stroke code called for concern of LUE, LLE numbness and dysarthria.  Found to have R lateral pontine stroke.    Plan  Antithrombotics for secondary stroke prevention: DAPT x30 days then ASA 81 monotherapy there after    Statins for secondary stroke prevention and hyperlipidemia, if present:   START atorvastatin 40 mg Qdaily, LDL pending    Aggressive risk factor modification: HTN     Rehab efforts: The patient has been evaluated by a stroke team provider and the therapy needs have been fully considered based off the presenting complaints and exam findings. The following therapy evaluations are needed: PT evaluate and treat, OT evaluate and treat, SLP evaluate and treat, PM&R evaluate for appropriate placement    Diagnostics ordered/pending: HgbA1C to assess blood glucose levels, Lipid Profile to assess cholesterol levels, TTE to assess cardiac function/status     VTE prophylaxis: Heparin 5000 units SQ every 8 hours Place SCDs for mechanical ppx.    BP parameters: Infarct: No intervention, SBP <220      Hyperlipidemia  Stroke risk factor  -START atorvastatin 40 mg Qdaily; pending LDL    Essential hypertension  Stroke risk factor  -Likely hypertensive in response to infarct  -Permissive HTN to SBP <220 x24 hrs followed by gradual taper.        STROKE DOCUMENTATION     Acute Stroke Times   Last Known Normal Date: 04/20/20  Last Known Normal Time: (unknown)  Symptom Onset Date: 04/20/20  Symptom Onset Time: (unknown)  Stroke Team Called Date: 04/20/20  Stroke Team Called Time: 1321  Stroke Team Arrival Date: 04/20/20  Stroke Team Arrival Time: 1326  CT Interpretation Time: 1330    NIH Scale:  Interval: baseline  1a. Level of  "Consciousness: 0-->Alert, keenly responsive  1b. LOC Questions: 0-->Answers both questions correctly  1c. LOC Commands: 0-->Performs both tasks correctly  2. Best Gaze: 0-->Normal  3. Visual: 0-->No visual loss  4. Facial Palsy: 0-->Normal symmetrical movements  5a. Motor Arm, Left: 0-->No drift, limb holds 90 (or 45) degrees for full 10 secs  5b. Motor Arm, Right: 0-->No drift, limb holds 90 (or 45) degrees for full 10 secs  6a. Motor Leg, Left: 0-->No drift, leg holds 30 degree position for full 5 secs  6b. Motor Leg, Right: 0-->No drift, leg holds 30 degree position for full 5 secs  7. Limb Ataxia: 0-->Absent  8. Sensory: 1-->Mild-to-moderate sensory loss, patient feels pinprick is less sharp or is dull on the affected side, or there is a loss of superficial pain with pinprick, but patient is aware of being touched  9. Best Language: 1-->Mild-to-moderate aphasia, some obvious loss of fluency or facility of comprehension, without significant limitation on ideas expressed or form of expression. Reduction of speech and/or comprehension, however, makes conversation. . . (see row details)(missed "hammock")  10. Dysarthria: 1-->Mild-to-moderate dysarthria, patient slurs at least some words and, at worst, can be understood with some difficulty  11. Extinction and Inattention (formerly Neglect): 0-->No abnormality  Total (NIH Stroke Scale): 3     Modified [place] Score: 0  [place] Coma Scale:15   ABCD2 Score:    [id number] Score:   HAS -BLED Score:   ICH Score:   Hunt & Barajas Classification:      Thrombolysis Candidate? No, Out of window     Delays to Thrombolysis?  No    Interventional Revascularization Candidate?   Is the patient eligible for mechanical endovascular reperfusion (PRANAV)?  No; No large vessel occlusion    Hemorrhagic change of an Ischemic Stroke: Does this patient have an ischemic stroke with hemorrhagic changes? No         Subjective:     History of Present Illness:  Mr. Tejeda is a 56 yo male w/ PMH " significant for HTN who presented to [facility] ED on 4/20.  Stroke code called for concern of LUE, LLE numbness and dysarthria.      Pt reports that he began having episodes of the above symptoms beginning 3 days (Saturday) prior to presentation.  States that he awoke with these symptoms at 0800 the day of presentation and wife encouraged him to come to ED for evaluation.  In ED, SBP in 220s.  He is not sure what his BP typically runs.  He has not been on any antihypertensives in 2 years, per pt report.    Drinks a beer or less most days after work ().  Denies cigarette use, recreational drug use.  Occasional ASA use for pain, does not take on a regular basis.        No past medical history on file.  No past surgical history on file.  No family history on file.  Social History     Tobacco Use    Smoking status: Not on file   Substance Use Topics    Alcohol use: Not on file    Drug use: Not on file     Review of patient's allergies indicates:  No Known Allergies    Medications: I have reviewed the current medication administration record.      (Not in a hospital admission)    Review of Systems   Constitutional: Negative for fever.   HENT: Negative for facial swelling.    Eyes: Negative for redness.   Respiratory: Negative for cough.    Cardiovascular: Negative for leg swelling.   Gastrointestinal: Negative for vomiting.   Skin: Negative for rash.   Allergic/Immunologic: Negative for immunocompromised state.   Neurological: Positive for speech difficulty and numbness. Negative for facial asymmetry.   Psychiatric/Behavioral: Negative for confusion.     Objective:     Vital Signs (Most Recent):  Temp: 99.3 °F (37.4 °C) (04/20/20 1311)  Pulse: 87 (04/20/20 1400)  Resp: 18 (04/20/20 1400)  BP: (!) 174/100 (04/20/20 1400)  SpO2: 98 % (04/20/20 1400)    Vital Signs Range (Last 24H):  Temp:  [99.3 °F (37.4 °C)]   Pulse:  [83-94]   Resp:  [18-19]   BP: (174-222)/(100-133)   SpO2:  [97 %-100 %]     Physical Exam    Constitutional: He is oriented to person, place, and time. He appears well-developed. No distress.   HENT:   Head: Normocephalic.   Eyes: Conjunctivae are normal.   Cardiovascular: Normal rate, regular rhythm and normal heart sounds. Exam reveals no friction rub.   No murmur heard.  Pulmonary/Chest: Effort normal and breath sounds normal. No respiratory distress. He has no wheezes.   Abdominal: Soft. Bowel sounds are normal. He exhibits no distension. There is no tenderness.   Musculoskeletal: He exhibits no edema.   Neurological: He is alert and oriented to person, place, and time.   Skin: Skin is warm and dry.   Psychiatric: He has a normal mood and affect.       Neurological Exam:   LOC: alert  Language: slight receptive aphasia (missed hammock)  Articulation: Dysarthria (somewhat obscured by surgical mask)  Orientation: oriented to month/year, day.  Visual Fields: Full  EOM (CN III, IV, VI): Full/intact  Pupils (CN II, III): PERRL  Facial Sensation (CN V): Normal  Facial Movement (CN VII): Symmetric facial expression    Motor: 5/5 all extremities  Cebellar: finger/nose intact b/l  Sensation: Intact to light touch in all extremities, reports subjective tingling in LUE, LLE      Laboratory:  Recent Results (from the past 12 hour(s))   CBC W/ AUTO DIFFERENTIAL    Collection Time: 04/20/20  1:43 PM   Result Value Ref Range    WBC 4.89 3.90 - 12.70 K/uL    RBC 5.44 4.60 - 6.20 M/uL    Hemoglobin 16.7 14.0 - 18.0 g/dL    Hematocrit 51.6 40.0 - 54.0 %    Mean Corpuscular Volume 95 82 - 98 fL    Mean Corpuscular Hemoglobin 30.7 27.0 - 31.0 pg    Mean Corpuscular Hemoglobin Conc 32.4 32.0 - 36.0 g/dL    RDW 12.7 11.5 - 14.5 %    Platelets 176 150 - 350 K/uL    MPV 10.2 9.2 - 12.9 fL    Immature Granulocytes 0.2 0.0 - 0.5 %    Gran # (ANC) 2.7 1.8 - 7.7 K/uL    Immature Grans (Abs) 0.01 0.00 - 0.04 K/uL    Lymph # 1.6 1.0 - 4.8 K/uL    Mono # 0.5 0.3 - 1.0 K/uL    Eos # 0.1 0.0 - 0.5 K/uL    Baso # 0.03 0.00 - 0.20 K/uL     nRBC 0 0 /100 WBC    Gran% 54.9 38.0 - 73.0 %    Lymph% 32.7 18.0 - 48.0 %    Mono% 10.2 4.0 - 15.0 %    Eosinophil% 1.4 0.0 - 8.0 %    Basophil% 0.6 0.0 - 1.9 %    Differential Method Automated    Protime-INR    Collection Time: 20  1:43 PM   Result Value Ref Range    Prothrombin Time 10.9 9.0 - 12.5 sec    INR 1.1 0.8 - 1.2   TSH    Collection Time: 20  1:43 PM   Result Value Ref Range    TSH 0.571 0.400 - 4.000 uIU/mL   ISTAT CREATININE    Collection Time: 20  1:53 PM   Result Value Ref Range    POC Creatinine 1.1 0.5 - 1.4 mg/dL    Sample unknown            Diagnostic Results:    Brain imagin20 CTH: Per independent resident review and interpretation,  No acute infarct, hemorrhage, nor mass effect.  Remote lacunar infarct L anterior thalamus.  Prominent L vert and basilar, suspect unlikely to be hyperdense vessel sign.            20 MRI Brain w/o contrast: Per independent resident review and interpretation,  Acute infarct in R lateral david, long circumferential artery distribution.          Vessel Imagin20 MRA Brain and neck w/o: Per independent resident review and interpretation,  No large-vessel occlusion, hemodynamically-significant stenosis, nor aneurysm visualized.        Cardiac Evaluation:   20 EKG: Per independent resident review and interpretation,  NSR.  QTc 462.            Marino Randolph MD  Comprehensive Stroke Center  Department of Vascular Neurology   [facility]

## 2020-04-20 NOTE — ED NOTES
I spoke with wife outside, states on Friday had weakness r arm and leg with numbness ( she thinks R side), stayed in bed all weekend, still cant walk

## 2020-04-21 PROBLEM — G93.6 CYTOTOXIC CEREBRAL EDEMA: Status: ACTIVE | Noted: 2020-04-21

## 2020-04-21 LAB
ALBUMIN SERPL BCP-MCNC: 4 G/DL (ref 3.5–5.2)
ALP SERPL-CCNC: 178 U/L (ref 55–135)
ALT SERPL W/O P-5'-P-CCNC: 64 U/L (ref 10–44)
ANION GAP SERPL CALC-SCNC: 13 MMOL/L (ref 8–16)
APTT BLDCRRT: 25.6 SEC (ref 21–32)
ASCENDING AORTA: 3.39 CM
AST SERPL-CCNC: 50 U/L (ref 10–40)
AV INDEX (PROSTH): 0.85
AV MEAN GRADIENT: 2 MMHG
AV PEAK GRADIENT: 3 MMHG
AV VALVE AREA: 2.75 CM2
AV VELOCITY RATIO: 0.64
BASOPHILS # BLD AUTO: 0.04 K/UL (ref 0–0.2)
BASOPHILS NFR BLD: 0.8 % (ref 0–1.9)
BILIRUB SERPL-MCNC: 0.6 MG/DL (ref 0.1–1)
BSA FOR ECHO PROCEDURE: 1.88 M2
BUN SERPL-MCNC: 13 MG/DL (ref 6–20)
CALCIUM SERPL-MCNC: 9.6 MG/DL (ref 8.7–10.5)
CHLORIDE SERPL-SCNC: 103 MMOL/L (ref 95–110)
CK MB SERPL-MCNC: 1.6 NG/ML (ref 0.1–6.5)
CK MB SERPL-RTO: 0.3 % (ref 0–5)
CK SERPL-CCNC: 548 U/L (ref 20–200)
CO2 SERPL-SCNC: 21 MMOL/L (ref 23–29)
CREAT SERPL-MCNC: 1.1 MG/DL (ref 0.5–1.4)
CV ECHO LV RWT: 0.41 CM
DIFFERENTIAL METHOD: NORMAL
DOP CALC AO PEAK VEL: 0.92 M/S
DOP CALC AO VTI: 13.8 CM
DOP CALC LVOT AREA: 3.2 CM2
DOP CALC LVOT DIAMETER: 2.03 CM
DOP CALC LVOT PEAK VEL: 0.59 M/S
DOP CALC LVOT STROKE VOLUME: 37.91 CM3
DOP CALCLVOT PEAK VEL VTI: 11.72 CM
E WAVE DECELERATION TIME: 188.52 MSEC
E/A RATIO: 0.72
E/E' RATIO: 6.83 M/S
ECHO LV POSTERIOR WALL: 0.88 CM (ref 0.6–1.1)
EOSINOPHIL # BLD AUTO: 0.2 K/UL (ref 0–0.5)
EOSINOPHIL NFR BLD: 2.9 % (ref 0–8)
ERYTHROCYTE [DISTWIDTH] IN BLOOD BY AUTOMATED COUNT: 12.7 % (ref 11.5–14.5)
EST. GFR  (AFRICAN AMERICAN): >60 ML/MIN/1.73 M^2
EST. GFR  (NON AFRICAN AMERICAN): >60 ML/MIN/1.73 M^2
FRACTIONAL SHORTENING: 32 % (ref 28–44)
GLUCOSE SERPL-MCNC: 81 MG/DL (ref 70–110)
HCT VFR BLD AUTO: 48.9 % (ref 40–54)
HGB BLD-MCNC: 15.7 G/DL (ref 14–18)
IMM GRANULOCYTES # BLD AUTO: 0.02 K/UL (ref 0–0.04)
IMM GRANULOCYTES NFR BLD AUTO: 0.4 % (ref 0–0.5)
INR PPP: 1.1 (ref 0.8–1.2)
INTERVENTRICULAR SEPTUM: 1.1 CM (ref 0.6–1.1)
IVRT: 97.05 MSEC
LA MAJOR: 4.49 CM
LA MINOR: 4.62 CM
LA WIDTH: 4.29 CM
LEFT ATRIUM SIZE: 3.53 CM
LEFT ATRIUM VOLUME INDEX: 31.5 ML/M2
LEFT ATRIUM VOLUME: 58.62 CM3
LEFT INTERNAL DIMENSION IN SYSTOLE: 2.95 CM (ref 2.1–4)
LEFT VENTRICLE DIASTOLIC VOLUME INDEX: 45.6 ML/M2
LEFT VENTRICLE DIASTOLIC VOLUME: 84.74 ML
LEFT VENTRICLE MASS INDEX: 77 G/M2
LEFT VENTRICLE SYSTOLIC VOLUME INDEX: 18.1 ML/M2
LEFT VENTRICLE SYSTOLIC VOLUME: 33.61 ML
LEFT VENTRICULAR INTERNAL DIMENSION IN DIASTOLE: 4.34 CM (ref 3.5–6)
LEFT VENTRICULAR MASS: 142.61 G
LV LATERAL E/E' RATIO: 6.83 M/S
LV SEPTAL E/E' RATIO: 6.83 M/S
LYMPHOCYTES # BLD AUTO: 1.8 K/UL (ref 1–4.8)
LYMPHOCYTES NFR BLD: 34.2 % (ref 18–48)
MAGNESIUM SERPL-MCNC: 2 MG/DL (ref 1.6–2.6)
MCH RBC QN AUTO: 30.5 PG (ref 27–31)
MCHC RBC AUTO-ENTMCNC: 32.1 G/DL (ref 32–36)
MCV RBC AUTO: 95 FL (ref 82–98)
MONOCYTES # BLD AUTO: 0.6 K/UL (ref 0.3–1)
MONOCYTES NFR BLD: 11.4 % (ref 4–15)
MV PEAK A VEL: 0.57 M/S
MV PEAK E VEL: 0.41 M/S
NEUTROPHILS # BLD AUTO: 2.6 K/UL (ref 1.8–7.7)
NEUTROPHILS NFR BLD: 50.3 % (ref 38–73)
NRBC BLD-RTO: 0 /100 WBC
PHOSPHATE SERPL-MCNC: 4.2 MG/DL (ref 2.7–4.5)
PISA TR MAX VEL: 1.89 M/S
PLATELET # BLD AUTO: 189 K/UL (ref 150–350)
PMV BLD AUTO: 10 FL (ref 9.2–12.9)
POCT GLUCOSE: 108 MG/DL (ref 70–110)
POTASSIUM SERPL-SCNC: 3.5 MMOL/L (ref 3.5–5.1)
PROT SERPL-MCNC: 8.3 G/DL (ref 6–8.4)
PROTHROMBIN TIME: 11 SEC (ref 9–12.5)
PULM VEIN S/D RATIO: 1.53
PV PEAK D VEL: 0.36 M/S
PV PEAK S VEL: 0.55 M/S
RA MAJOR: 3.72 CM
RA PRESSURE: 3 MMHG
RA WIDTH: 3.89 CM
RBC # BLD AUTO: 5.14 M/UL (ref 4.6–6.2)
RIGHT VENTRICULAR END-DIASTOLIC DIMENSION: 3.25 CM
RV TISSUE DOPPLER FREE WALL SYSTOLIC VELOCITY 1 (APICAL 4 CHAMBER VIEW): 20.23 CM/S
SINUS: 3.53 CM
SODIUM SERPL-SCNC: 137 MMOL/L (ref 136–145)
STJ: 3.03 CM
TDI LATERAL: 0.06 M/S
TDI SEPTAL: 0.06 M/S
TDI: 0.06 M/S
TR MAX PG: 14 MMHG
TRICUSPID ANNULAR PLANE SYSTOLIC EXCURSION: 1.54 CM
TROPONIN I SERPL DL<=0.01 NG/ML-MCNC: <0.006 NG/ML (ref 0–0.03)
TV REST PULMONARY ARTERY PRESSURE: 17 MMHG
WBC # BLD AUTO: 5.18 K/UL (ref 3.9–12.7)

## 2020-04-21 PROCEDURE — 99233 SBSQ HOSP IP/OBS HIGH 50: CPT | Mod: ,,, | Performed by: PSYCHIATRY & NEUROLOGY

## 2020-04-21 PROCEDURE — 80053 COMPREHEN METABOLIC PANEL: CPT

## 2020-04-21 PROCEDURE — 85730 THROMBOPLASTIN TIME PARTIAL: CPT

## 2020-04-21 PROCEDURE — 82550 ASSAY OF CK (CPK): CPT

## 2020-04-21 PROCEDURE — 92523 SPEECH SOUND LANG COMPREHEN: CPT

## 2020-04-21 PROCEDURE — 82553 CREATINE MB FRACTION: CPT

## 2020-04-21 PROCEDURE — 97162 PT EVAL MOD COMPLEX 30 MIN: CPT

## 2020-04-21 PROCEDURE — 25000003 PHARM REV CODE 250: Performed by: NURSE PRACTITIONER

## 2020-04-21 PROCEDURE — 97530 THERAPEUTIC ACTIVITIES: CPT

## 2020-04-21 PROCEDURE — 85025 COMPLETE CBC W/AUTO DIFF WBC: CPT

## 2020-04-21 PROCEDURE — 96372 THER/PROPH/DIAG INJ SC/IM: CPT

## 2020-04-21 PROCEDURE — 36415 COLL VENOUS BLD VENIPUNCTURE: CPT

## 2020-04-21 PROCEDURE — 25000003 PHARM REV CODE 250: Performed by: STUDENT IN AN ORGANIZED HEALTH CARE EDUCATION/TRAINING PROGRAM

## 2020-04-21 PROCEDURE — 85610 PROTHROMBIN TIME: CPT

## 2020-04-21 PROCEDURE — 99233 PR SUBSEQUENT HOSPITAL CARE,LEVL III: ICD-10-PCS | Mod: ,,, | Performed by: PSYCHIATRY & NEUROLOGY

## 2020-04-21 PROCEDURE — 92610 EVALUATE SWALLOWING FUNCTION: CPT

## 2020-04-21 PROCEDURE — 97165 OT EVAL LOW COMPLEX 30 MIN: CPT

## 2020-04-21 PROCEDURE — 84100 ASSAY OF PHOSPHORUS: CPT

## 2020-04-21 PROCEDURE — 63600175 PHARM REV CODE 636 W HCPCS: Performed by: STUDENT IN AN ORGANIZED HEALTH CARE EDUCATION/TRAINING PROGRAM

## 2020-04-21 PROCEDURE — 83735 ASSAY OF MAGNESIUM: CPT

## 2020-04-21 PROCEDURE — 11000001 HC ACUTE MED/SURG PRIVATE ROOM

## 2020-04-21 PROCEDURE — 84484 ASSAY OF TROPONIN QUANT: CPT

## 2020-04-21 RX ORDER — CARVEDILOL 6.25 MG/1
6.25 TABLET ORAL ONCE
Status: DISCONTINUED | OUTPATIENT
Start: 2020-04-21 | End: 2020-04-21

## 2020-04-21 RX ORDER — CARVEDILOL 12.5 MG/1
12.5 TABLET ORAL ONCE
Status: DISCONTINUED | OUTPATIENT
Start: 2020-04-21 | End: 2020-04-21

## 2020-04-21 RX ORDER — CARVEDILOL 6.25 MG/1
6.25 TABLET ORAL 2 TIMES DAILY
Status: DISCONTINUED | OUTPATIENT
Start: 2020-04-21 | End: 2020-04-21

## 2020-04-21 RX ORDER — CARVEDILOL 6.25 MG/1
6.25 TABLET ORAL ONCE
Status: COMPLETED | OUTPATIENT
Start: 2020-04-21 | End: 2020-04-21

## 2020-04-21 RX ORDER — CARVEDILOL 25 MG/1
25 TABLET ORAL 2 TIMES DAILY WITH MEALS
Status: DISCONTINUED | OUTPATIENT
Start: 2020-04-22 | End: 2020-04-22

## 2020-04-21 RX ADMIN — CLOPIDOGREL BISULFATE 75 MG: 75 TABLET ORAL at 09:04

## 2020-04-21 RX ADMIN — ATORVASTATIN CALCIUM 40 MG: 40 TABLET, FILM COATED ORAL at 09:04

## 2020-04-21 RX ADMIN — HEPARIN SODIUM 5000 UNITS: 5000 INJECTION INTRAVENOUS; SUBCUTANEOUS at 09:04

## 2020-04-21 RX ADMIN — HEPARIN SODIUM 5000 UNITS: 5000 INJECTION INTRAVENOUS; SUBCUTANEOUS at 03:04

## 2020-04-21 RX ADMIN — CARVEDILOL 6.25 MG: 6.25 TABLET, FILM COATED ORAL at 05:04

## 2020-04-21 RX ADMIN — ASPIRIN 81 MG: 81 TABLET, COATED ORAL at 09:04

## 2020-04-21 RX ADMIN — CARVEDILOL 6.25 MG: 6.25 TABLET, FILM COATED ORAL at 11:04

## 2020-04-21 RX ADMIN — HEPARIN SODIUM 5000 UNITS: 5000 INJECTION INTRAVENOUS; SUBCUTANEOUS at 06:04

## 2020-04-21 NOTE — PLAN OF CARE
Problem: SLP Goal  Goal: SLP Goal  Outcome: Met  Bedside and speech/language/cognitive evaluations completed.  Pt appears to be at baseline for all areas assessed. No further skilled SLP services warranted at this time.   ZA Sherman, CCC-SLP  Speech Language Pathologist  [phone]  4/21/2020

## 2020-04-21 NOTE — PLAN OF CARE
04/21/20 1121   Discharge Assessment   Assessment Type Discharge Planning Assessment   Confirmed/corrected address and phone number on facesheet? Yes   Expected Length of Stay (days) 1   Communicated expected length of stay with patient/caregiver yes   Prior to hospitilization cognitive status: Alert/Oriented   Prior to hospitalization functional status: Independent   Current cognitive status: Alert/Oriented   Current Functional Status: Independent   Lives With spouse   Able to Return to Prior Arrangements yes   Is patient able to care for self after discharge? Yes   Patient's perception of discharge disposition home or selfcare   Readmission Within the Last 30 Days no previous admission in last 30 days   Patient currently being followed by outpatient case management? No   Patient currently receives any other outside agency services? No   Equipment Currently Used at Home none   Do you have any problems affording any of your prescribed medications? No   Is the patient taking medications as prescribed? yes   Does the patient have transportation home? Yes   Transportation Anticipated family or friend will provide   Does the patient receive services at the [facility]? No   Discharge Plan A Home   Discharge Plan B Home with family   DME Needed Upon Discharge  none      conducted discharge planning assessment at bedside. Patient verified information on face sheet. Patient lives at home with wife in a townhouse with 15 steps inside. Patient denies use of assistive devices. Patient has transportation upon discharge.Pt has no additional concerns at this time.     No primary care provider on file.    No Pharmacies Listed    Fabrizio Vargas RN Case Manager

## 2020-04-21 NOTE — PT/OT/SLP EVAL
"Physical Therapy Evaluation and Discharge Note    Patient Name:  Teodoro Tejeda   MRN:  [id number]    Recommendations:     Discharge Recommendations:  home   Discharge Equipment Recommendations: none   Barriers to discharge: None    Assessment:     Teodoro Tejeda is a 55 y.o. male admitted with a medical diagnosis of Right pontine stroke. At this time, patient is functioning at their prior level of function and does not require further acute PT services.     Recent Surgery: * No surgery found *      Plan:     During this hospitalization, patient does not require further acute PT services.  Please re-consult if situation changes.      Subjective     Chief Complaint: "My left arm was feeling numb, but now it feels better, it feels like my right arm.", "My wife was the one who told me to come to the hospital when my arm was numb, I'm glad I did"  Patient/Family Comments/goals: return home  Pain/Comfort:  · Pain Rating 1: 0/10    Patients cultural, spiritual, Mormon conflicts given the current situation: no    Living Environment:  The patient lives with his wife in [place] in a townhouse (15 steps to bedroom, CAITLYN HR), [address]. PTA he was driving and working as a .     Prior to admission, patients level of function was independent.  Equipment used at home: none.  DME owned (not currently used): none.  Upon discharge, patient will have assistance from wife (she works as a manager at [employer] during the day).    Objective:     Communicated with RN prior to session.  Patient found up in bathroom with peripheral IV upon PT entry to room.    BP sitting /126; asymptomatic. Per RN patient BP parameters are <220 systolic; RN alerted    General Precautions: Standard, fall   Orthopedic Precautions:N/A   Braces: N/A     Exams:    Cognitive Exam  Patient is A&O x0 and follows 100% of one -step commands   No visual acuity or tracking deficits   Fine Motor Coordination -Heel to shin intact CAITLYN LE  -Mild deficits L " "UE fingertip to thumb, mild decreased accuracy and speed   Postural Exam Patient presented with the following abnormalities:    -       Rounded shoulders     Sensation    -       Light touch intact CAITLYN UE and LE   Skin Integrity/Edema     -       Skin integrity: visibly intact  -       Edema: NA   R LE ROM WFL   R LE Strength 5/5 hip flexion, knee ext/flex, and ankle DF/PF   L LE ROM WFL   L LE Strength  5/5 hip flexion, knee ext/flex, and ankle DF/PF        Balance          Static Sitting independent    Dynamic Sitting Independent   Static Standing independent    Dynamic Standing supervision assistance   Gait with horizontal/vertical head turns: stand by assistance, mild decrease gait speed, mild uneven step lengths; patient reports this is due to "being in bed for so long"  BOLD indicates loss of balance with activity                Functional Mobility:    Bed Mobility  Deferred, patient found ambulating in room   Transfers Sit to Stand:  independent    Gait  Gait Distance: 150 ft with no AD  Assistance Level: independent   Description: with dynamic balance activities and dual task, some decreased gait speed, uneven step lengths- patient attributes this to being in bed for so long   Stairs Ascended/descended: 12 stairs   Quality: reciprocal strides, good speed, no SOB  Level of assist: stand by assistance  Rails used: R          AM-PAC 6 CLICK MOBILITY  Total Score:24       Therapeutic Activities and Exercises:   Patient educated on role of therapy, goals of session, benefits of out of bed mobility. Patient agreeable to mobilize with therapy.  Discussed PT plan of care during hospitalization. Patient educated that they need to call for assistance to mobilize out of bed. Whiteboard updated as appropriate. Patient educated on how their diagnosis impacts their mobility within PT scope of practice.   Patient encouraged to continue ambulating during hospitalization to prevent functional decline.     Discussed PT " recommendation- DC acute therapy, home no needs. Patient in agreement with POC. Patient ed to alert MD and RN to change in strength/sensation/vision/coordination and to request therapy should he notice a change in functional mobility- he is in agreement.     AM-PAC 6 CLICK MOBILITY  Total Score:24     Patient left sitting EOB with all lines intact and RN notified.    GOALS:   Multidisciplinary Problems     Physical Therapy Goals     Not on file          Multidisciplinary Problems (Resolved)        Problem: Physical Therapy Goal    Goal Priority Disciplines Outcome Goal Variances Interventions   Physical Therapy Goal   (Resolved)     PT, PT/OT Met     Description:  The patient is independent with mobility at this time, no therapy needs on discharge. Safe to return home when medically appropriate. Discharge skilled acute PT.   Vale Trejo, PT  4/21/2020                         History:     Past Medical History:   Diagnosis Date    Hypertension        History reviewed. No pertinent surgical history.    Time Tracking:     PT Received On: 04/21/20  PT Start Time: 0949     PT Stop Time: 1000  PT Total Time (min): 11 min     Billable Minutes: Evaluation 11      Vale Trejo PT  04/21/2020

## 2020-04-21 NOTE — CONSULTS
Food & Nutrition  Education    Diet Education: Cardiac diet  Time Spent: 15 minutes  Learners: patient      Nutrition Education provided with handouts: low sodium diet      Comments: Pt eats a lot of [employer] chicken, Chinese food, sandwiches. Educated pt on low sodium options for takeout. Pt's wife works at [employer]: provided suggestions for lower sodium foods he could get at [employer]. Educated pt on label reading to plan sodium consumption.    All questions and concerns answered. Dietitian's contact information provided.       Follow-Up: yes    Please Re-consult as needed        Thanks!    Lina Goldberg, RD  Ext [phone]

## 2020-04-21 NOTE — PLAN OF CARE
Problem: Physical Therapy Goal  Goal: Physical Therapy Goal  Description  The patient is independent with mobility at this time, no therapy needs on discharge. Safe to return home when medically appropriate. Discharge skilled acute PT.   Vale Trejo, LEOBARDO  4/21/2020        Outcome: Met

## 2020-04-21 NOTE — PLAN OF CARE
04/21/2020      [address] [place] [address]          Vascular Neurology Dept.  [facility] [address] [place] [address] [phone] after hours  [phone] fax A family member of Jazmyn Tejeda has been hospitalized at the [facility] since 4/20/2020. Please excuse her from work on 4/22/20 so she is able to provide the assistance necessary for hospital discharge and transition home.      Please contact me if you have any questions.                  __________________________  Mariana Boo, JOSEY  04/21/2020

## 2020-04-21 NOTE — PLAN OF CARE
Progressing towards goal, unremarkable neuro checks, denies headache and vision change. BP stable, safety maintained, no fall or injury occurred on this shift.

## 2020-04-21 NOTE — PLAN OF CARE
04/21/2020      Teodoro Tejeda  [address] [place] [id number]          Vascular Neurology Dept.  [facility] [address] [place] [id number] [phone] after hours  [phone] fax Teodoro Tejeda has been hospitalized at the [facility] since 4/20/2020. Please excuse the patient from duties. Patient may return on 4/27/20. No restrictions.       Please contact me if you have any questions.                  __________________________  Mariana Boo NP  04/21/2020

## 2020-04-21 NOTE — PLAN OF CARE
Patient denies pain, weakness or signs of infection.  No neurological deficits assessed.  Blood pressure down slightly with medications.  Appetite and ambulation normal.

## 2020-04-21 NOTE — CARE UPDATE
Spoke to Mr. Valerio's wife regarding hospital stay and discharge plan.    Updated her on current imaging results, stroke etiology, and stroke prevention. She does not know if patient has insurance and does not have insurance information. She completed a Medicaid form this morning for Mr. Valerio.     I stressed the importance to patient and her about compliance with blood pressure medications. DAO/LUCIANA set up a virtual clinic for patient in [place].     She and patient were instructed they will receive a telephone call to set up appointment. She reports she has a BP cuff at home and I informed her she should check patient's BP daily and keep BP log for virtual visit.     I have included in patient's discharge instructions information regarding high blood pressure and education on how to check his blood pressure.     Mariana Boo, JOSEY  [facility]  Department of Vascular Neurology   [facility]  [phone]

## 2020-04-21 NOTE — SUBJECTIVE & OBJECTIVE
Neurologic Chief Complaint: R pontine infarct     Subjective:     Interval History: Patient is seen for follow-up neurological assessment and treatment recommendations:     Patient doing well on exam. NIH 0. MRI with R pontine infarct. Starting Coreg 6.25 mg BID.     HPI, Past Medical, Family, and Social History remains the same as documented in the initial encounter.     Review of Systems   Constitutional: Negative for fever.   Eyes: Negative for visual disturbance.   Respiratory: Negative for wheezing.    Gastrointestinal: Negative for nausea and vomiting.   Neurological: Negative for weakness and headaches.   Psychiatric/Behavioral: Negative for agitation and confusion.     Scheduled Meds:   aspirin  81 mg Oral Daily    atorvastatin  40 mg Oral Daily    carvediloL  6.25 mg Oral BID    clopidogreL  75 mg Oral Daily    heparin (porcine)  5,000 Units Subcutaneous Q8H     Continuous Infusions:   sodium chloride 0.9%       PRN Meds:labetaloL, sodium chloride 0.9%, sodium chloride 0.9%    Objective:     Vital Signs (Most Recent):  Temp: 97.3 °F (36.3 °C) (04/21/20 0443)  Pulse: 71 (04/21/20 1054)  Resp: 10 (04/21/20 0400)  BP: (!) 166/105 (04/21/20 0835)  SpO2: 95 % (04/21/20 0400)  BP Location: Right arm    Vital Signs Range (Last 24H):  Temp:  [97.2 °F (36.2 °C)-99.3 °F (37.4 °C)]   Pulse:  [62-94]   Resp:  [10-20]   BP: (157-225)/()   SpO2:  [94 %-100 %]   BP Location: Right arm    Physical Exam   Constitutional: He is oriented to person, place, and time. He appears well-developed.   HENT:   Head: Normocephalic.   Cardiovascular: Normal rate.   Pulmonary/Chest: Effort normal.   Musculoskeletal: Normal range of motion.   Neurological: He is alert and oriented to person, place, and time.   Skin: Skin is warm and dry.   Vitals reviewed.      Neurological Exam:   LOC: alert  Attention Span: Good   Language: No aphasia  Articulation: No dysarthria  Orientation: Person, Place, Time   Visual Fields: Full  EOM  (CN III, IV, VI): Full/intact  Pupils (CN II, III): PERRL  Facial Movement (CN VII): Symmetric facial expression    Motor: Arm left  Normal 5/5  Leg left  Normal 5/5  Arm right  Normal 5/5  Leg right Normal 5/5  Cebellar: No evidence of appendicular or axial ataxia  Sensation: Intact to light touch, temperature and vibration    Laboratory:  CMP:   Recent Labs   Lab 04/21/20  0401   CALCIUM 9.6   ALBUMIN 4.0   PROT 8.3      K 3.5   CO2 21*      BUN 13   CREATININE 1.1   ALKPHOS 178*   ALT 64*   AST 50*   BILITOT 0.6     BMP:   Recent Labs   Lab 04/21/20  0401      K 3.5      CO2 21*   BUN 13   CREATININE 1.1   CALCIUM 9.6     CBC:   Recent Labs   Lab 04/21/20  0402   WBC 5.18   RBC 5.14   HGB 15.7   HCT 48.9      MCV 95   MCH 30.5   MCHC 32.1     Lipid Panel:   Recent Labs   Lab 04/20/20  1851   CHOL 211*   LDLCALC 146.0   HDL 50   TRIG 75     Coagulation:   Recent Labs   Lab 04/21/20  0401   INR 1.1   APTT 25.6     Platelet Aggregation Study: No results for input(s): PLTAGG, PLTAGINTERP, PLTAGREGLACO, ADPPLTAGGREG in the last 168 hours.  Hgb A1C:   Recent Labs   Lab 04/20/20  1343   HGBA1C 5.1     TSH:   Recent Labs   Lab 04/20/20  1343   TSH 0.571       Diagnostic Results     Brain Imaging   MRI brain 4/20  Mild moderate sized signal abnormality lateral aspect the right david as detailed above most suggestive for recent infarction.    Superimposed small to punctate size foci of T2 FLAIR signal hyperintensity elsewhere supratentorial white matter which are nonspecific and may represent age advanced chronic ischemic change.    Clinical correlation and follow-up advised.      Vessel Imaging   MRA brain and neck 4/20  MRA head: Unremarkable MRA of the head specifically without evidence for focal stenosis or definite intracranial aneurysm.    MRA neck: Unremarkable motion distorted MRA of the neck as detailed above without evidence for significant focal stenosis or occlusion.    Cardiac  Imaging   TTE 4/21  · Normal left ventricular systolic function. The estimated ejection fraction is 60%.  · Normal LV diastolic function.  · Normal right ventricular systolic function.  · Normal central venous pressure (3 mmHg).

## 2020-04-21 NOTE — CONSULTS
Inpatient consult to Physical Medicine Rehab  Consult performed by: Erica Murphy NP  Consult ordered by: Marino Randolph MD  Reason for consult: assess rehab needs        Reviewed patient history and current admission.  Rehab team following.  Full consult to follow.    DEYA Cunningham, FNP-C  Physical Medicine & Rehabilitation   04/21/2020

## 2020-04-21 NOTE — PT/OT/SLP EVAL
Speech Language Pathology Evaluation/ Discharge  Cognitive/Bedside Swallow    Patient Name:  Teodoro Tejeda   MRN:  [id number]  Admitting Diagnosis: Right pontine stroke    Recommendations:                  General Recommendations:  Follow-up not indicated  Diet recommendations:  Regular, Thin   Aspiration Precautions: 1 bite/sip at a time, Alternating bites/sips, HOB to 90 degrees, Monitor for s/s of aspiration, Small bites/sips and Standard aspiration precautions   General Precautions: Standard, fall  Communication strategies:  none    History:     Past Medical History:   Diagnosis Date    Hypertension      History of Present Illness:  Mr. Tejeda is a 56 yo male w/ PMH significant for HTN who presented to [facility] ED on 4/20.  Stroke code called for concern of LUE, LLE numbness and dysarthria.       Pt reports that he began having episodes of the above symptoms beginning 3 days (Saturday) prior to presentation.  States that he awoke with these symptoms at 0800 the day of presentation and wife encouraged him to come to ED for evaluation.  In ED, SBP in 220s.  He is not sure what his BP typically runs.  He has not been on any antihypertensives in 2 years, per pt report.     Drinks a beer or less most days after work ().  Denies cigarette use, recreational drug use.  Occasional ASA use for pain, does not take on a regular basis.    History reviewed. No pertinent surgical history.    Prior Intubation HX:  None during this admission    Modified Barium Swallow: none on file    Chest X-Rays: 4/20/20: No acute abnormality.    Prior diet: regular consistencies/ thin liquids; passed KARAN with 20/20    Occupation/hobbies/homemaking: Pt works as a fork  and loads trucks.    Subjective     Pt very pleasant and cooperative.  Pt denies noted changes in cognition.     Pain/Comfort:  · Pain Rating 1: 0/10    Objective:     Cognitive Status:    O x 4. Immediate and delayed recall were WFL. Problem solving and  reasoning q's were answered appropriately.      Receptive Language:   Comprehension:   Pt answered complex yes/no q's and followed 3-step commands with 100% accuracy.     Pragmatics:    WNl    Expressive Language:  Verbal:    Conversational speech WNL. No evidence of expressive language deficits.  During a word fluency task, pt listed 15 items in a category in one minute (15-20 is wnl)    Motor Speech:  WFL    Voice:   WFL    Visual-Spatial:  WFL    Reading:   WFL - appears to be at baseline. Pt with some reading difficulty a/w lack of reading glasses and suspected decreased literacy at baseline.     Oral Musculature Evaluation  · Oral Musculature: WNL  · Dentition: scattered dentition  · Secretion Management: adequate  · Mucosal Quality: good  · Mandibular Strength and Mobility: WNL  · Oral Labial Strength and Mobility: WNL  · Lingual Strength and Mobility: WNL  · Velar Elevation: WNL  · Buccal Strength and Mobility: WNL  · Volitional Cough: strong  · Volitional Swallow: elicited  · Voice Prior to PO Intake: dry, clear    Bedside Swallow Eval:   Consistencies Assessed:  · Thin liquids cup and straw sips - ~3oz  · Solids 1 jasmine cracker     Oral Phase:   · WNL    Pharyngeal Phase:   · no overt clinical signs/symptoms of aspiration  · no overt clinical signs/symptoms of pharyngeal dysphagia    Compensatory Strategies  · None    Treatment: Education provided to pt regarding role of SLP, reason for SLP consult, cognition, results of evaluation, and no recommendations for further skilled SLP services at this time.  Pt encouraged to report any changes in cognition or vision not evident at this time.     Assessment:     Teodoro Tejeda is a 55 y.o. male. Speech, language, cognition, and swallowing abilities appear to be at baseline. No skilled SLP services warranted at this time.     Goals:   Multidisciplinary Problems     SLP Goals     Not on file          Multidisciplinary Problems (Resolved)        Problem: SLP Goal     Goal Priority Disciplines Outcome   SLP Goal   (Resolved)     SLP Met                   Plan:     · Patient to be seen:      · Plan of Care expires:     · Plan of Care reviewed with:  patient   · SLP Follow-Up:  No       Discharge recommendations:  Discharge Facility/Level of Care Needs: home     Time Tracking:     SLP Treatment Date:   04/21/20  Speech Start Time:  1117  Speech Stop Time:  1135     Speech Total Time (min):  18 min    Billable Minutes: Eval 10  and Eval Swallow and Oral Function 8    ZA Sherman, CCC-SLP  04/21/2020     ZA Sherman, CCC-SLP  Speech Language Pathologist  [phone]  4/21/2020

## 2020-04-21 NOTE — PT/OT/SLP EVAL
"Occupational Therapy   Evaluation and Discharge Note    Name: Teodoro Tejeda  MRN: [id number]  Admitting Diagnosis:  Right pontine stroke      Recommendations:     Discharge Recommendations: home  Discharge Equipment Recommendations:  none  Barriers to discharge:  None    Assessment:     Teodoro Tejeda is a 55 y.o. male with a medical diagnosis of Right pontine stroke. At this time, patient is functioning at their prior level of function and does not require further acute OT services.     Plan:     During this hospitalization, patient does not require further acute OT services.  Please re-consult if situation changes.    · Plan of Care Reviewed with: patient    Subjective     Patient:  "I hadn't gotten my blood pressure medication refilled after moving here.  I got out the shower and was feeling bad on Saturday and I found out I had a stroke.  I just thought I was tired.  I had a little numbness on my left arm."     Occupational Profile:  Patient resides in [place] with wife in [facility] with bedroom on the 2nd floor.  PTA patient independent with ADLs including driving.  Patient is left handed.  Works: .  Hobbies:  Pool, drinking.    Pain/Comfort:  · Pain Rating 1: 0/10  · Pain Rating Post-Intervention 1: 0/10    Patients cultural, spiritual, Yazidism conflicts given the current situation: no    Objective:     Communicated with: Nurse prior to session.  Patient found supine with peripheral IV upon OT entry to room.  Family not present.    General Precautions: Standard, fall   Orthopedic Precautions:N/A   Braces: N/A     Occupational Performance:    Bed Mobility:    · Patient completed Rolling/Turning to Left with  independence  · Patient completed Rolling/Turning to Right with independence  · Patient completed Scooting/Bridging with independence  · Patient completed Supine to Sit with independence  · Patient completed Sit to Supine with independence    Functional Mobility/Transfers:  · Patient " completed Sit <> Stand Transfer with independence  with  no assistive device   · Patient completed Bed <> Chair Transfer using Stand Pivot technique with independence with no assistive device    Activities of Daily Living:  · Grooming: independence while standing  · Upper Body Dressing: independence    · Lower Body Dressing: independence    · Toileting: independence      Cognitive/Visual Perceptual:  Cognitive/Psychosocial Skills:     -       Oriented to: Person, Place, Time and Situation   -       Follows Commands/attention:Follows one-step commands  -       Communication: clear/fluent  Visual/Perceptual:      -Intact      Physical Exam:  Postural examination/scapula alignment:    -       Rounded shoulders  Skin integrity: Visible skin intact  Edema:  None noted  Sensation:    -       Intact  Upper Extremity Range of Motion:     -       Right Upper Extremity: WNL  -       Left Upper Extremity: WNL  Upper Extremity Strength:    -       Right Upper Extremity: WNL  -       Left Upper Extremity: WNL    AMPAC 6 Click ADL:  AMPAC Total Score: 24    Treatment & Education:  Patient education provided for stroke warning signs, prevention guidelines and personal risk factors.  Patient verbalizing understanding via teach back method.  Patient education provided on role of OT and need for no further OT upon discharge.  Patient alert and oriented x 3; able to follow 4/4 one step commands.  Patient attentive and interactive throughout the session.  Patient's functional status and disposition recommendation discussed with stroke team in daily rounds.  White board updated in patient's room.  OT asked if there were any other questions; patient had no further questions.   Education:    Patient left supine with all lines intact and call button in reach    GOALS:   Multidisciplinary Problems     Occupational Therapy Goals     Not on file          Multidisciplinary Problems (Resolved)        Problem: Occupational Therapy Goal    Goal  Priority Disciplines Outcome Interventions   Occupational Therapy Goal   (Resolved)     OT, PT/OT Met    Description:  Goals not set 2* no further OT needed.  IRMA Stone  4/21/2020                      History:     Past Medical History:   Diagnosis Date    Hypertension        History reviewed. No pertinent surgical history.    Time Tracking:     OT Date of Treatment: 04/21/20  OT Start Time: 0643  OT Stop Time: 0703  OT Total Time (min): 20 min    Billable Minutes:Evaluation 10  Therapeutic Activity 10    IRMA Sotne  4/21/2020

## 2020-04-21 NOTE — ASSESSMENT & PLAN NOTE
Mr. Tejeda is a 56 yo male w/ PMH significant for HTN who presented to [facility] ED on 4/20.  Stroke code called for concern of LUE, LLE numbness and dysarthria.  Found to have R lateral pontine stroke. Etiology small vessel disease.    NIH 0. Therapy teams recommending home with no needs. Will start Coreg today with possible dc later this afternoon or tomorrow pending BP response to medication.     Plan  Antithrombotics for secondary stroke prevention: DAPT x30 days then ASA 81 monotherapy there after    Statins for secondary stroke prevention and hyperlipidemia, if present:   Atorvastatin 40 mg Qdaily    Aggressive risk factor modification: HTN     Rehab efforts: The patient has been evaluated by a stroke team provider and the therapy needs have been fully considered based off the presenting complaints and exam findings. The following therapy evaluations are needed: PT evaluate and treat, OT evaluate and treat, SLP evaluate and treat, PM&R evaluate for appropriate placement - home no needs     Diagnostics ordered/pending: NA    VTE prophylaxis: Heparin 5000 units SQ every 8 hours Place SCDs for mechanical ppx.    BP parameters: SBP<180

## 2020-04-21 NOTE — PROGRESS NOTES
[facility]  Vascular Neurology  Comprehensive Stroke Center  Progress Note    Assessment/Plan:     * Right pontine stroke  Mr. Tejeda is a 54 yo male w/ PMH significant for HTN who presented to [facility] ED on 4/20.  Stroke code called for concern of LUE, LLE numbness and dysarthria.  Found to have R lateral pontine stroke. Etiology small vessel disease.    NIH 0. Therapy teams recommending home with no needs. Will start Coreg today with possible dc later this afternoon or tomorrow pending BP response to medication.     Plan  Antithrombotics for secondary stroke prevention: DAPT x30 days then ASA 81 monotherapy there after    Statins for secondary stroke prevention and hyperlipidemia, if present:   Atorvastatin 40 mg Qdaily    Aggressive risk factor modification: HTN     Rehab efforts: The patient has been evaluated by a stroke team provider and the therapy needs have been fully considered based off the presenting complaints and exam findings. The following therapy evaluations are needed: PT evaluate and treat, OT evaluate and treat, SLP evaluate and treat, PM&R evaluate for appropriate placement - home no needs     Diagnostics ordered/pending: NA    VTE prophylaxis: Heparin 5000 units SQ every 8 hours Place SCDs for mechanical ppx.    BP parameters: SBP<180          Cytotoxic cerebral edema  Area of cytotoxic cerebral edema identified when reviewing brain imaging in the territory of the R posterior cerebral artery. There is not mass effect associated with it. We will continue to monitor the patients clinical exam for any worsening of symptoms which may indicate expansion of the stroke or the area of the edema resulting in the clinical change. The pattern is suggestive of small vessel etiology        Hyperlipidemia  Stroke risk factor    Atorvastatin 40 mg     Essential hypertension  Stroke risk factor  -Likely hypertensive in response to infarct  -SBP <180   -Patient previously on Coreg but  has not taken in 2 year due to moving and not establishing care with a new PCP  -Starting Coreg 6.25 mg BID today          [place]20: [place] [facility] [place]  [place]21: Patient doing well on exam. NIH 0. MRI with R pontine infarct. Starting Coreg 6.25 mg BID.     STROKE DOCUMENTATION   Acute Stroke Times   Last Known Normal Date: 04/20/20  Last Known Normal Time: (unknown)  Symptom Onset Date: 04/20/20  Symptom Onset Time: (unknown)  Stroke Team Called Date: 04/20/20  Stroke Team Called Time: 1321  Stroke Team Arrival Date: 04/20/20  Stroke Team Arrival Time: 1326  CT Interpretation Time: 1330    NIH Scale:  1a. Level of Consciousness: 0-->Alert, keenly responsive  1b. LOC Questions: 0-->Answers both questions correctly  1c. LOC Commands: 0-->Performs both tasks correctly  2. Best Gaze: 0-->Normal  3. Visual: 0-->No visual loss  4. Facial Palsy: 0-->Normal symmetrical movements  5a. Motor Arm, Left: 0-->No drift, limb holds 90 (or 45) degrees for full 10 secs  5b. Motor Arm, Right: 0-->No drift, limb holds 90 (or 45) degrees for full 10 secs  6a. Motor Leg, Left: 0-->No drift, leg holds 30 degree position for full 5 secs  6b. Motor Leg, Right: 0-->No drift, leg holds 30 degree position for full 5 secs  7. Limb Ataxia: 0-->Absent  8. Sensory: 0-->Normal, no sensory loss  9. Best Language: 0-->No aphasia, normal  10. Dysarthria: 0-->Normal  11. Extinction and Inattention (formerly Neglect): 0-->No abnormality  Total (NIH Stroke Scale): 0       Modified [place] Score: 0  Berna Coma Scale:    ABCD2 Score:    [id number] Score:   HAS -BLED Score:   ICH Score:   Hunt & Barajas Classification:      Hemorrhagic change of an Ischemic Stroke: Does this patient have an ischemic stroke with hemorrhagic changes? No     Neurologic Chief Complaint: R pontine infarct     Subjective:     Interval History: Patient is seen for follow-up neurological assessment and treatment recommendations:     Patient doing well on exam. NIH 0. MRI with R pontine  infarct. Starting Coreg 6.25 mg BID.     HPI, Past Medical, Family, and Social History remains the same as documented in the initial encounter.     Review of Systems   Constitutional: Negative for fever.   Eyes: Negative for visual disturbance.   Respiratory: Negative for wheezing.    Gastrointestinal: Negative for nausea and vomiting.   Neurological: Negative for weakness and headaches.   Psychiatric/Behavioral: Negative for agitation and confusion.     Scheduled Meds:   aspirin  81 mg Oral Daily    atorvastatin  40 mg Oral Daily    carvediloL  6.25 mg Oral BID    clopidogreL  75 mg Oral Daily    heparin (porcine)  5,000 Units Subcutaneous Q8H     Continuous Infusions:   sodium chloride 0.9%       PRN Meds:labetaloL, sodium chloride 0.9%, sodium chloride 0.9%    Objective:     Vital Signs (Most Recent):  Temp: 97.3 °F (36.3 °C) (04/21/20 0443)  Pulse: 71 (04/21/20 1054)  Resp: 10 (04/21/20 0400)  BP: (!) 166/105 (04/21/20 0835)  SpO2: 95 % (04/21/20 0400)  BP Location: Right arm    Vital Signs Range (Last 24H):  Temp:  [97.2 °F (36.2 °C)-99.3 °F (37.4 °C)]   Pulse:  [62-94]   Resp:  [10-20]   BP: (157-225)/()   SpO2:  [94 %-100 %]   BP Location: Right arm    Physical Exam   Constitutional: He is oriented to person, place, and time. He appears well-developed.   HENT:   Head: Normocephalic.   Cardiovascular: Normal rate.   Pulmonary/Chest: Effort normal.   Musculoskeletal: Normal range of motion.   Neurological: He is alert and oriented to person, place, and time.   Skin: Skin is warm and dry.   Vitals reviewed.      Neurological Exam:   LOC: alert  Attention Span: Good   Language: No aphasia  Articulation: No dysarthria  Orientation: Person, Place, Time   Visual Fields: Full  EOM (CN III, IV, VI): Full/intact  Pupils (CN II, III): PERRL  Facial Movement (CN VII): Symmetric facial expression    Motor: Arm left  Normal 5/5  Leg left  Normal 5/5  Arm right  Normal 5/5  Leg right Normal 5/5  Cebellar: No  evidence of appendicular or axial ataxia  Sensation: Intact to light touch, temperature and vibration    Laboratory:  CMP:   Recent Labs   Lab 04/21/20  0401   CALCIUM 9.6   ALBUMIN 4.0   PROT 8.3      K 3.5   CO2 21*      BUN 13   CREATININE 1.1   ALKPHOS 178*   ALT 64*   AST 50*   BILITOT 0.6     BMP:   Recent Labs   Lab 04/21/20  0401      K 3.5      CO2 21*   BUN 13   CREATININE 1.1   CALCIUM 9.6     CBC:   Recent Labs   Lab 04/21/20  0402   WBC 5.18   RBC 5.14   HGB 15.7   HCT 48.9      MCV 95   MCH 30.5   MCHC 32.1     Lipid Panel:   Recent Labs   Lab 04/20/20  1851   CHOL 211*   LDLCALC 146.0   HDL 50   TRIG 75     Coagulation:   Recent Labs   Lab 04/21/20  0401   INR 1.1   APTT 25.6     Platelet Aggregation Study: No results for input(s): PLTAGG, PLTAGINTERP, PLTAGREGLACO, ADPPLTAGGREG in the last 168 hours.  Hgb A1C:   Recent Labs   Lab 04/20/20  1343   HGBA1C 5.1     TSH:   Recent Labs   Lab 04/20/20  1343   TSH 0.571       Diagnostic Results     Brain Imaging   MRI brain 4/20  Mild moderate sized signal abnormality lateral aspect the right david as detailed above most suggestive for recent infarction.    Superimposed small to punctate size foci of T2 FLAIR signal hyperintensity elsewhere supratentorial white matter which are nonspecific and may represent age advanced chronic ischemic change.    Clinical correlation and follow-up advised.      Vessel Imaging   MRA brain and neck 4/20  MRA head: Unremarkable MRA of the head specifically without evidence for focal stenosis or definite intracranial aneurysm.    MRA neck: Unremarkable motion distorted MRA of the neck as detailed above without evidence for significant focal stenosis or occlusion.    Cardiac Imaging   TTE 4/21  · Normal left ventricular systolic function. The estimated ejection fraction is 60%.  · Normal LV diastolic function.  · Normal right ventricular systolic function.  · Normal central venous pressure (3  mmHg).      Mariana Boo NP  Comprehensive Stroke Center  Department of Vascular Neurology   [facility]

## 2020-04-21 NOTE — ASSESSMENT & PLAN NOTE
Stroke risk factor  -Likely hypertensive in response to infarct  -SBP <180   -Patient previously on Coreg but has not taken in 2 year due to moving and not establishing care with a new PCP  -Starting Coreg 6.25 mg BID today

## 2020-04-21 NOTE — PLAN OF CARE
OT evaluation completed.  IRMA Stone  4/21/2020    Problem: Occupational Therapy Goal  Goal: Occupational Therapy Goal  Description  Goals not set 2* no further OT needed.  IRMA Stone  4/21/2020     Outcome: Met

## 2020-04-21 NOTE — ASSESSMENT & PLAN NOTE
Area of cytotoxic cerebral edema identified when reviewing brain imaging in the territory of the R posterior cerebral artery. There is not mass effect associated with it. We will continue to monitor the patients clinical exam for any worsening of symptoms which may indicate expansion of the stroke or the area of the edema resulting in the clinical change. The pattern is suggestive of small vessel etiology

## 2020-04-22 VITALS
TEMPERATURE: 99 F | HEIGHT: 67 IN | BODY MASS INDEX: 25.74 KG/M2 | SYSTOLIC BLOOD PRESSURE: 152 MMHG | OXYGEN SATURATION: 94 % | RESPIRATION RATE: 14 BRPM | WEIGHT: 164 LBS | DIASTOLIC BLOOD PRESSURE: 104 MMHG | HEART RATE: 72 BPM

## 2020-04-22 LAB
ALBUMIN SERPL BCP-MCNC: 4 G/DL (ref 3.5–5.2)
ALP SERPL-CCNC: 183 U/L (ref 55–135)
ALT SERPL W/O P-5'-P-CCNC: 87 U/L (ref 10–44)
ANION GAP SERPL CALC-SCNC: 9 MMOL/L (ref 8–16)
AST SERPL-CCNC: 70 U/L (ref 10–40)
BASOPHILS # BLD AUTO: 0.05 K/UL (ref 0–0.2)
BASOPHILS NFR BLD: 0.9 % (ref 0–1.9)
BILIRUB SERPL-MCNC: 0.6 MG/DL (ref 0.1–1)
BUN SERPL-MCNC: 18 MG/DL (ref 6–20)
CALCIUM SERPL-MCNC: 10.1 MG/DL (ref 8.7–10.5)
CHLORIDE SERPL-SCNC: 103 MMOL/L (ref 95–110)
CO2 SERPL-SCNC: 28 MMOL/L (ref 23–29)
CREAT SERPL-MCNC: 1.4 MG/DL (ref 0.5–1.4)
DIFFERENTIAL METHOD: NORMAL
EOSINOPHIL # BLD AUTO: 0.2 K/UL (ref 0–0.5)
EOSINOPHIL NFR BLD: 4.2 % (ref 0–8)
ERYTHROCYTE [DISTWIDTH] IN BLOOD BY AUTOMATED COUNT: 12.6 % (ref 11.5–14.5)
EST. GFR  (AFRICAN AMERICAN): >60 ML/MIN/1.73 M^2
EST. GFR  (NON AFRICAN AMERICAN): 56.2 ML/MIN/1.73 M^2
GLUCOSE SERPL-MCNC: 92 MG/DL (ref 70–110)
HCT VFR BLD AUTO: 49.3 % (ref 40–54)
HGB BLD-MCNC: 15.8 G/DL (ref 14–18)
IMM GRANULOCYTES # BLD AUTO: 0.03 K/UL (ref 0–0.04)
IMM GRANULOCYTES NFR BLD AUTO: 0.5 % (ref 0–0.5)
LYMPHOCYTES # BLD AUTO: 2.1 K/UL (ref 1–4.8)
LYMPHOCYTES NFR BLD: 37.9 % (ref 18–48)
MCH RBC QN AUTO: 30.5 PG (ref 27–31)
MCHC RBC AUTO-ENTMCNC: 32 G/DL (ref 32–36)
MCV RBC AUTO: 95 FL (ref 82–98)
MONOCYTES # BLD AUTO: 0.6 K/UL (ref 0.3–1)
MONOCYTES NFR BLD: 11.7 % (ref 4–15)
NEUTROPHILS # BLD AUTO: 2.4 K/UL (ref 1.8–7.7)
NEUTROPHILS NFR BLD: 44.8 % (ref 38–73)
NRBC BLD-RTO: 0 /100 WBC
PLATELET # BLD AUTO: 170 K/UL (ref 150–350)
PMV BLD AUTO: 9.6 FL (ref 9.2–12.9)
POTASSIUM SERPL-SCNC: 4.9 MMOL/L (ref 3.5–5.1)
PROT SERPL-MCNC: 8.1 G/DL (ref 6–8.4)
RBC # BLD AUTO: 5.18 M/UL (ref 4.6–6.2)
SODIUM SERPL-SCNC: 140 MMOL/L (ref 136–145)
WBC # BLD AUTO: 5.46 K/UL (ref 3.9–12.7)

## 2020-04-22 PROCEDURE — 85025 COMPLETE CBC W/AUTO DIFF WBC: CPT

## 2020-04-22 PROCEDURE — 99239 PR HOSPITAL DISCHARGE DAY,>30 MIN: ICD-10-PCS | Mod: ,,, | Performed by: PSYCHIATRY & NEUROLOGY

## 2020-04-22 PROCEDURE — 36415 COLL VENOUS BLD VENIPUNCTURE: CPT

## 2020-04-22 PROCEDURE — 25000003 PHARM REV CODE 250: Performed by: STUDENT IN AN ORGANIZED HEALTH CARE EDUCATION/TRAINING PROGRAM

## 2020-04-22 PROCEDURE — 99239 HOSP IP/OBS DSCHRG MGMT >30: CPT | Mod: ,,, | Performed by: PSYCHIATRY & NEUROLOGY

## 2020-04-22 PROCEDURE — 80053 COMPREHEN METABOLIC PANEL: CPT

## 2020-04-22 PROCEDURE — 63600175 PHARM REV CODE 636 W HCPCS: Performed by: STUDENT IN AN ORGANIZED HEALTH CARE EDUCATION/TRAINING PROGRAM

## 2020-04-22 RX ORDER — ASPIRIN 81 MG/1
81 TABLET ORAL DAILY
Qty: 360 TABLET | Refills: 0 | Status: SHIPPED | OUTPATIENT
Start: 2020-04-23 | End: 2021-01-07 | Stop reason: SDUPTHER

## 2020-04-22 RX ORDER — CARVEDILOL 12.5 MG/1
12.5 TABLET ORAL 2 TIMES DAILY WITH MEALS
Qty: 60 TABLET | Refills: 11 | Status: SHIPPED | OUTPATIENT
Start: 2020-04-22 | End: 2020-04-24 | Stop reason: SDUPTHER

## 2020-04-22 RX ORDER — ATORVASTATIN CALCIUM 40 MG/1
40 TABLET, FILM COATED ORAL DAILY
Qty: 90 TABLET | Refills: 3 | Status: SHIPPED | OUTPATIENT
Start: 2020-04-23 | End: 2020-05-27 | Stop reason: SDUPTHER

## 2020-04-22 RX ORDER — ATORVASTATIN CALCIUM 40 MG/1
40 TABLET, FILM COATED ORAL DAILY
Qty: 90 TABLET | Refills: 3 | Status: SHIPPED | OUTPATIENT
Start: 2020-04-23 | End: 2020-04-22

## 2020-04-22 RX ORDER — CARVEDILOL 12.5 MG/1
12.5 TABLET ORAL 2 TIMES DAILY WITH MEALS
Status: DISCONTINUED | OUTPATIENT
Start: 2020-04-22 | End: 2020-04-22 | Stop reason: HOSPADM

## 2020-04-22 RX ORDER — CLOPIDOGREL BISULFATE 75 MG/1
75 TABLET ORAL DAILY
Qty: 30 TABLET | Refills: 0 | Status: SHIPPED | OUTPATIENT
Start: 2020-04-23 | End: 2020-08-10

## 2020-04-22 RX ORDER — CLOPIDOGREL BISULFATE 75 MG/1
75 TABLET ORAL DAILY
Qty: 30 TABLET | Refills: 0 | Status: SHIPPED | OUTPATIENT
Start: 2020-04-23 | End: 2020-04-22

## 2020-04-22 RX ORDER — CLOPIDOGREL BISULFATE 75 MG/1
75 TABLET ORAL DAILY
Qty: 30 TABLET | Refills: 11 | Status: SHIPPED | OUTPATIENT
Start: 2020-04-23 | End: 2020-04-22

## 2020-04-22 RX ORDER — CARVEDILOL 12.5 MG/1
12.5 TABLET ORAL 2 TIMES DAILY WITH MEALS
Qty: 60 TABLET | Refills: 11 | Status: SHIPPED | OUTPATIENT
Start: 2020-04-22 | End: 2020-04-22

## 2020-04-22 RX ORDER — ASPIRIN 81 MG/1
81 TABLET ORAL DAILY
Qty: 360 TABLET | Refills: 0 | Status: SHIPPED | OUTPATIENT
Start: 2020-04-23 | End: 2020-04-22

## 2020-04-22 RX ADMIN — ASPIRIN 81 MG: 81 TABLET, COATED ORAL at 08:04

## 2020-04-22 RX ADMIN — HEPARIN SODIUM 5000 UNITS: 5000 INJECTION INTRAVENOUS; SUBCUTANEOUS at 05:04

## 2020-04-22 RX ADMIN — CARVEDILOL 12.5 MG: 12.5 TABLET, FILM COATED ORAL at 08:04

## 2020-04-22 RX ADMIN — ATORVASTATIN CALCIUM 40 MG: 40 TABLET, FILM COATED ORAL at 10:04

## 2020-04-22 RX ADMIN — CLOPIDOGREL BISULFATE 75 MG: 75 TABLET ORAL at 08:04

## 2020-04-22 NOTE — PLAN OF CARE
04/22/20 1001   Post-Acute Status   Post-Acute Authorization Other   Other Status No Post-Acute Service Needs     SW was informed by medical team that patient will be discharged once BP is controlled. SW will continue to follow and assist with needs as inidicated.    Kala Maldonado, SHMUEL  [facility]  [id number]

## 2020-04-22 NOTE — PROGRESS NOTES
[facility]  Vascular Neurology  Comprehensive Stroke Center  Progress Note    Assessment/Plan:     * Right pontine stroke  Mr. Tejeda is a 54 yo male w/ PMH significant for HTN who presented to [facility] ED on 4/20.  Stroke code called for concern of LUE, LLE numbness and dysarthria.  Found to have R lateral pontine stroke. Etiology small vessel disease.    Coreg increased to 12.5 BID. No issues w/ the medication. Discussed management and follow up w/ patient. Stable for discharge.     Plan  Antithrombotics for secondary stroke prevention: DAPT x30 days then ASA 81 monotherapy there after    Statins for secondary stroke prevention and hyperlipidemia, if present:   Atorvastatin 40 mg Qdaily    Aggressive risk factor modification: HTN     Rehab efforts: The patient has been evaluated by a stroke team provider and the therapy needs have been fully considered based off the presenting complaints and exam findings. The following therapy evaluations are needed: PT evaluate and treat, OT evaluate and treat, SLP evaluate and treat, PM&R evaluate for appropriate placement - home no needs     Diagnostics ordered/pending: NA    VTE prophylaxis: Heparin 5000 units SQ every 8 hours Place SCDs for mechanical ppx.    BP parameters: SBP<180          Cytotoxic cerebral edema  - Area of cytotoxic cerebral edema identified when reviewing brain imaging in the territory of the R posterior cerebral artery.   - There is not mass effect associated with it. We will continue to monitor the patients clinical exam for any worsening of symptoms which may indicate expansion of the stroke or the area of the edema resulting in the clinical change.   - The pattern is suggestive of small vessel etiology        Hyperlipidemia  - Stroke risk factor  -   - Atorvastatin 40 mg     Essential hypertension  - Stroke risk factor  - Likely hypertensive in response to infarct  - SBP <180   - Patient previously on Coreg but has not taken in  2 year due to moving and not establishing care with a new PCP  - Discharge on Coreg 12.5 mg BID t  - Closed outpatient follow up        4/20: [place] [facility] [place]  4/21: Patient doing well on exam. NIH 0. MRI with R pontine infarct. Starting Coreg 6.25 mg BID.   04/22/2020 Coreg increased to 12.5 BID. No issues w/ the medication. Discussed management and follow up w/ patient. Stable for discharge.       STROKE DOCUMENTATION   Acute Stroke Times   Last Known Normal Date: 04/20/20  Last Known Normal Time: (unknown)  Symptom Onset Date: 04/20/20  Symptom Onset Time: (unknown)  Stroke Team Called Date: 04/20/20  Stroke Team Called Time: 1321  Stroke Team Arrival Date: 04/20/20  Stroke Team Arrival Time: 1326  CT Interpretation Time: 1330    NIH Scale:  1a. Level of Consciousness: 0-->Alert, keenly responsive  1b. LOC Questions: 0-->Answers both questions correctly  1c. LOC Commands: 0-->Performs both tasks correctly  2. Best Gaze: 0-->Normal  3. Visual: 0-->No visual loss  4. Facial Palsy: 0-->Normal symmetrical movements  5a. Motor Arm, Left: 0-->No drift, limb holds 90 (or 45) degrees for full 10 secs  5b. Motor Arm, Right: 0-->No drift, limb holds 90 (or 45) degrees for full 10 secs  6a. Motor Leg, Left: 0-->No drift, leg holds 30 degree position for full 5 secs  6b. Motor Leg, Right: 0-->No drift, leg holds 30 degree position for full 5 secs  7. Limb Ataxia: 0-->Absent  8. Sensory: 0-->Normal, no sensory loss  9. Best Language: 0-->No aphasia, normal  10. Dysarthria: 0-->Normal  11. Extinction and Inattention (formerly Neglect): 0-->No abnormality  Total (NIH Stroke Scale): 0       Modified [place] Score: 0  Fletcher Coma Scale:    ABCD2 Score:    [id number] Score:   HAS -BLED Score:   ICH Score:   Hunt & Barajas Classification:      Hemorrhagic change of an Ischemic Stroke: Does this patient have an ischemic stroke with hemorrhagic changes? No     Neurologic Chief Complaint: R pontine infarct     Subjective:      Interval History: Patient is seen for follow-up neurological assessment and treatment recommendations:     Coreg increased to 12.5 BID. No issues w/ the medication. Discussed management and follow up w/ patient. Stable for discharge.   BP better controlled. Was worried about increasing the Coreg any further, unsure if he has had a reaction to the medication in the past.      HPI, Past Medical, Family, and Social History remains the same as documented in the initial encounter.     Review of Systems   Constitutional: Negative for fever.   Eyes: Negative for visual disturbance.   Respiratory: Negative for wheezing.    Gastrointestinal: Negative for nausea and vomiting.   Neurological: Negative for weakness and headaches.   Psychiatric/Behavioral: Negative for agitation and confusion.     Scheduled Meds:   aspirin  81 mg Oral Daily    atorvastatin  40 mg Oral Daily    carvediloL  12.5 mg Oral BID WM    clopidogreL  75 mg Oral Daily    heparin (porcine)  5,000 Units Subcutaneous Q8H     Continuous Infusions:   sodium chloride 0.9%       PRN Meds:labetaloL, sodium chloride 0.9%, sodium chloride 0.9%    Objective:     Vital Signs (Most Recent):  Temp: 98.6 °F (37 °C) (04/22/20 1057)  Pulse: 72 (04/22/20 1000)  Resp: 14 (04/22/20 0900)  BP: (!) 152/104 (04/22/20 0900)  SpO2: (!) 94 % (04/22/20 0900)  BP Location: Left arm    Vital Signs Range (Last 24H):  Temp:  [97.3 °F (36.3 °C)-98.6 °F (37 °C)]   Pulse:  [60-91]   Resp:  [10-18]   BP: (142-190)/()   SpO2:  [94 %-98 %]   BP Location: Left arm    Physical Exam   Constitutional: He is oriented to person, place, and time. He appears well-developed.   HENT:   Head: Normocephalic.   Cardiovascular: Normal rate.   Pulmonary/Chest: Effort normal.   Musculoskeletal: Normal range of motion.   Neurological: He is alert and oriented to person, place, and time.   Skin: Skin is warm and dry.   Vitals reviewed.      Neurological Exam:   LOC: alert  Attention Span: Good    Language: No aphasia  Articulation: No dysarthria  Orientation: Person, Place, Time   Visual Fields: Full  EOM (CN III, IV, VI): Full/intact  Pupils (CN II, III): PERRL  Facial Movement (CN VII): Symmetric facial expression    Motor: Arm left  Normal 5/5  Leg left  Normal 5/5  Arm right  Normal 5/5  Leg right Normal 5/5  Cebellar: No evidence of appendicular or axial ataxia  Sensation: Intact to light touch, temperature and vibration    Laboratory:  CMP:   Recent Labs   Lab 04/22/20  0409   CALCIUM 10.1   ALBUMIN 4.0   PROT 8.1      K 4.9   CO2 28      BUN 18   CREATININE 1.4   ALKPHOS 183*   ALT 87*   AST 70*   BILITOT 0.6     BMP:   Recent Labs   Lab 04/22/20  0409      K 4.9      CO2 28   BUN 18   CREATININE 1.4   CALCIUM 10.1     CBC:   Recent Labs   Lab 04/22/20  0409   WBC 5.46   RBC 5.18   HGB 15.8   HCT 49.3      MCV 95   MCH 30.5   MCHC 32.0     Lipid Panel:   Recent Labs   Lab 04/20/20  1851   CHOL 211*   LDLCALC 146.0   HDL 50   TRIG 75     Coagulation:   Recent Labs   Lab 04/21/20  0401   INR 1.1   APTT 25.6     Platelet Aggregation Study: No results for input(s): PLTAGG, PLTAGINTERP, PLTAGREGLACO, ADPPLTAGGREG in the last 168 hours.  Hgb A1C:   Recent Labs   Lab 04/20/20  1343   HGBA1C 5.1     TSH:   Recent Labs   Lab 04/20/20  1343   TSH 0.571       Diagnostic Results     Brain Imaging   MRI brain 4/20  Mild moderate sized signal abnormality lateral aspect the right david as detailed above most suggestive for recent infarction.    Superimposed small to punctate size foci of T2 FLAIR signal hyperintensity elsewhere supratentorial white matter which are nonspecific and may represent age advanced chronic ischemic change.    Clinical correlation and follow-up advised.      Vessel Imaging   MRA brain and neck 4/20  MRA head: Unremarkable MRA of the head specifically without evidence for focal stenosis or definite intracranial aneurysm.    MRA neck: Unremarkable motion  distorted MRA of the neck as detailed above without evidence for significant focal stenosis or occlusion.    Cardiac Imaging   TTE 4/21  · Normal left ventricular systolic function. The estimated ejection fraction is 60%.  · Normal LV diastolic function.  · Normal right ventricular systolic function.  · Normal central venous pressure (3 mmHg).      Jewell Mejia MD  Comprehensive Stroke Center  Department of Vascular Neurology   [facility]

## 2020-04-22 NOTE — PLAN OF CARE
Plan of care reviewed with patient. Verbalized understanding. Patient is ambulatory. No from falls or injury noted this shift. Remains Afebrile. Visi and telemetry monitoring in progress in progress. All safety measures maintain. No acute distress noted. Call light in reach.

## 2020-04-22 NOTE — SUBJECTIVE & OBJECTIVE
Neurologic Chief Complaint: R pontine infarct     Subjective:     Interval History: Patient is seen for follow-up neurological assessment and treatment recommendations:     Coreg increased to 12.5 BID. No issues w/ the medication. Discussed management and follow up w/ patient. Stable for discharge.   BP better controlled. Was worried about increasing the Coreg any further, unsure if he has had a reaction to the medication in the past.      HPI, Past Medical, Family, and Social History remains the same as documented in the initial encounter.     Review of Systems   Constitutional: Negative for fever.   Eyes: Negative for visual disturbance.   Respiratory: Negative for wheezing.    Gastrointestinal: Negative for nausea and vomiting.   Neurological: Negative for weakness and headaches.   Psychiatric/Behavioral: Negative for agitation and confusion.     Scheduled Meds:   aspirin  81 mg Oral Daily    atorvastatin  40 mg Oral Daily    carvediloL  12.5 mg Oral BID WM    clopidogreL  75 mg Oral Daily    heparin (porcine)  5,000 Units Subcutaneous Q8H     Continuous Infusions:   sodium chloride 0.9%       PRN Meds:labetaloL, sodium chloride 0.9%, sodium chloride 0.9%    Objective:     Vital Signs (Most Recent):  Temp: 98.6 °F (37 °C) (04/22/20 1057)  Pulse: 72 (04/22/20 1000)  Resp: 14 (04/22/20 0900)  BP: (!) 152/104 (04/22/20 0900)  SpO2: (!) 94 % (04/22/20 0900)  BP Location: Left arm    Vital Signs Range (Last 24H):  Temp:  [97.3 °F (36.3 °C)-98.6 °F (37 °C)]   Pulse:  [60-91]   Resp:  [10-18]   BP: (142-190)/()   SpO2:  [94 %-98 %]   BP Location: Left arm    Physical Exam   Constitutional: He is oriented to person, place, and time. He appears well-developed.   HENT:   Head: Normocephalic.   Cardiovascular: Normal rate.   Pulmonary/Chest: Effort normal.   Musculoskeletal: Normal range of motion.   Neurological: He is alert and oriented to person, place, and time.   Skin: Skin is warm and dry.   Vitals  reviewed.      Neurological Exam:   LOC: alert  Attention Span: Good   Language: No aphasia  Articulation: No dysarthria  Orientation: Person, Place, Time   Visual Fields: Full  EOM (CN III, IV, VI): Full/intact  Pupils (CN II, III): PERRL  Facial Movement (CN VII): Symmetric facial expression    Motor: Arm left  Normal 5/5  Leg left  Normal 5/5  Arm right  Normal 5/5  Leg right Normal 5/5  Cebellar: No evidence of appendicular or axial ataxia  Sensation: Intact to light touch, temperature and vibration    Laboratory:  CMP:   Recent Labs   Lab 04/22/20  0409   CALCIUM 10.1   ALBUMIN 4.0   PROT 8.1      K 4.9   CO2 28      BUN 18   CREATININE 1.4   ALKPHOS 183*   ALT 87*   AST 70*   BILITOT 0.6     BMP:   Recent Labs   Lab 04/22/20  0409      K 4.9      CO2 28   BUN 18   CREATININE 1.4   CALCIUM 10.1     CBC:   Recent Labs   Lab 04/22/20  0409   WBC 5.46   RBC 5.18   HGB 15.8   HCT 49.3      MCV 95   MCH 30.5   MCHC 32.0     Lipid Panel:   Recent Labs   Lab 04/20/20  1851   CHOL 211*   LDLCALC 146.0   HDL 50   TRIG 75     Coagulation:   Recent Labs   Lab 04/21/20  0401   INR 1.1   APTT 25.6     Platelet Aggregation Study: No results for input(s): PLTAGG, PLTAGINTERP, PLTAGREGLACO, ADPPLTAGGREG in the last 168 hours.  Hgb A1C:   Recent Labs   Lab 04/20/20  1343   HGBA1C 5.1     TSH:   Recent Labs   Lab 04/20/20  1343   TSH 0.571       Diagnostic Results     Brain Imaging   MRI brain 4/20  Mild moderate sized signal abnormality lateral aspect the right david as detailed above most suggestive for recent infarction.    Superimposed small to punctate size foci of T2 FLAIR signal hyperintensity elsewhere supratentorial white matter which are nonspecific and may represent age advanced chronic ischemic change.    Clinical correlation and follow-up advised.      Vessel Imaging   MRA brain and neck 4/20  MRA head: Unremarkable MRA of the head specifically without evidence for focal stenosis or  definite intracranial aneurysm.    MRA neck: Unremarkable motion distorted MRA of the neck as detailed above without evidence for significant focal stenosis or occlusion.    Cardiac Imaging   TTE 4/21  · Normal left ventricular systolic function. The estimated ejection fraction is 60%.  · Normal LV diastolic function.  · Normal right ventricular systolic function.  · Normal central venous pressure (3 mmHg).

## 2020-04-22 NOTE — ASSESSMENT & PLAN NOTE
- Area of cytotoxic cerebral edema identified when reviewing brain imaging in the territory of the R posterior cerebral artery.   - There is not mass effect associated with it. We will continue to monitor the patients clinical exam for any worsening of symptoms which may indicate expansion of the stroke or the area of the edema resulting in the clinical change.   - The pattern is suggestive of small vessel etiology

## 2020-04-22 NOTE — ASSESSMENT & PLAN NOTE
- Stroke risk factor  - Likely hypertensive in response to infarct  - SBP <180   - Patient previously on Coreg but has not taken in 2 year due to moving and not establishing care with a new PCP  - Discharge on Coreg 12.5 mg BID t  - Closed outpatient follow up

## 2020-04-22 NOTE — DISCHARGE SUMMARY
[facility]  Vascular Neurology  Comprehensive Stroke Center  Discharge Summary     Summary:     Admit Date: 4/20/2020  1:12 PM    Discharge Date and Time:  04/22/2020 2:39 PM    Attending Physician: No att. providers found     Discharge Provider: Jewell Mejia MD    History of Present Illness: Mr. Tejeda is a 54 yo male w/ PMH significant for HTN who presented to [facility] ED on 4/20.  Stroke code called for concern of LUE, LLE numbness and dysarthria.    Pt reports that he began having episodes of the above symptoms beginning 3 days (Saturday) prior to presentation.  States that he awoke with these symptoms at 0800 the day of presentation and wife encouraged him to come to ED for evaluation.  In ED, SBP in 220s.  He is not sure what his BP typically runs.  He has not been on any antihypertensives in 2 years, per pt report.  Drinks a beer or less most days after work ().  Denies cigarette use, recreational drug use.  Occasional ASA use for pain, does not take on a regular basis.    Hospital Course (synopsis of major diagnoses, care, treatment, and services provided during the course of the hospital stay): 4/20: Presented to [facility] ED  4/21: Patient doing well on exam. NIH 0. MRI with R pontine infarct. Starting Coreg 6.25 mg BID.   04/22/2020 Coreg increased to 12.5 BID. No issues w/ the medication. Discussed management and follow up w/ patient. Stable for discharge.       Stroke Etiology: Probable Small Artery Occlusion (LUANA)    STROKE DOCUMENTATION   Acute Stroke Times   Last Known Normal Date: 04/20/20  Last Known Normal Time: (unknown)  Symptom Onset Date: 04/20/20  Symptom Onset Time: (unknown)  Stroke Team Called Date: 04/20/20  Stroke Team Called Time: 1321  Stroke Team Arrival Date: 04/20/20  Stroke Team Arrival Time: 1326  CT Interpretation Time: 1330     NIH Scale:  1a. Level of Consciousness: 0-->Alert, keenly responsive  1b. LOC Questions: 0-->Answers both questions  correctly  1c. LOC Commands: 0-->Performs both tasks correctly  2. Best Gaze: 0-->Normal  3. Visual: 0-->No visual loss  4. Facial Palsy: 0-->Normal symmetrical movements  5a. Motor Arm, Left: 0-->No drift, limb holds 90 (or 45) degrees for full 10 secs  5b. Motor Arm, Right: 0-->No drift, limb holds 90 (or 45) degrees for full 10 secs  6a. Motor Leg, Left: 0-->No drift, leg holds 30 degree position for full 5 secs  6b. Motor Leg, Right: 0-->No drift, leg holds 30 degree position for full 5 secs  7. Limb Ataxia: 0-->Absent  8. Sensory: 0-->Normal, no sensory loss  9. Best Language: 0-->No aphasia, normal  10. Dysarthria: 0-->Normal  11. Extinction and Inattention (formerly Neglect): 0-->No abnormality  Total (NIH Stroke Scale): 0        Modified Lane Score: 0  Berna Coma Scale:    ABCD2 Score:    [id number] Score:   HAS -BLED Score:   ICH Score:   Hunt & Barajas Classification:       Assessment/Plan:     Diagnostic Results:    Brain Imaging   MRI brain 4/20  Mild moderate sized signal abnormality lateral aspect the right david as detailed above most suggestive for recent infarction.    Superimposed small to punctate size foci of T2 FLAIR signal hyperintensity elsewhere supratentorial white matter which are nonspecific and may represent age advanced chronic ischemic change.    Clinical correlation and follow-up advised.       Vessel Imaging   MRA brain and neck 4/20  MRA head: Unremarkable MRA of the head specifically without evidence for focal stenosis or definite intracranial aneurysm.    MRA neck: Unremarkable motion distorted MRA of the neck as detailed above without evidence for significant focal stenosis or occlusion.     Cardiac Imaging   TTE 4/21  · Normal left ventricular systolic function. The estimated ejection fraction is 60%.  · Normal LV diastolic function.  · Normal right ventricular systolic function.  · Normal central venous pressure (3 mmHg).    Interventions: None    Complications:  None    Disposition: Home or Self Care    Final Active Diagnoses:    Diagnosis Date Noted POA    PRINCIPAL PROBLEM:  Right pontine stroke [I63.50] 04/20/2020 Yes    Cytotoxic cerebral edema [G93.6] 04/21/2020 Yes    Essential hypertension [I10] 04/20/2020 Yes    Cerebrovascular accident (CVA) [I63.9] 04/20/2020 Yes    Hyperlipidemia [E78.5] 04/20/2020 Yes      Problems Resolved During this Admission:     No new Assessment & Plan notes have been filed under this hospital service since the last note was generated.  Service: Vascular Neurology      Recommendations:     Post-discharge complication risks: Falls    Stroke Education given to: patient and family    Follow-up in Stroke Clinic in 4-6 weeks.     Discharge Plan:  Antithrombotics: Aspirin 81 mg, Clopidogrel 75mg X 30 days   Statin: Atorvastatin 40 mg  Smoking Cessation  Aggresive risk factor modification:  Hypertension  Smoking  High Cholesterol  Diet  Exercise  Obesity    Follow Up:  Follow-up Information     [facility] VASCULAR NEUROLOGY.    Specialty:  Vascular Neurology  Why:  Post-stroke follow-up  Contact information:  Bruna Blakely melissa  [facility] [id number] [phone]           [facility].    Contact information:  [address] [facility] [id number] [phone]                   Patient Instructions:   No discharge procedures on file.    Medications:  Reconciled Home Medications:      Medication List      START taking these medications    aspirin 81 MG EC tablet  Commonly known as:  ECOTRIN  Take 1 tablet (81 mg total) by mouth once daily.  Start taking on:  April 23, 2020     atorvastatin 40 MG tablet  Commonly known as:  LIPITOR  Take 1 tablet (40 mg total) by mouth once daily.  Start taking on:  April 23, 2020     carvediloL 12.5 MG tablet  Commonly known as:  COREG  Take 1 tablet (12.5 mg total) by mouth 2 (two) times daily with meals.     clopidogreL 75 mg tablet  Commonly known as:  PLAVIX  Take 1 tablet  (75 mg total) by mouth once daily.  Start taking on:  April 23, 2020            Jewell Mejia MD  [facility] St[facility]  Department of Vascular Neurology   [facility]

## 2020-04-22 NOTE — ASSESSMENT & PLAN NOTE
Mr. Tejeda is a 54 yo male w/ PMH significant for HTN who presented [place] [facility] [place] on 4/20.  Stroke code called for concern of LUE, LLE numbness and dysarthria.  Found to have R lateral pontine stroke. Etiology small vessel disease.    Coreg increased to 12.5 BID. No issues w/ the medication. Discussed management and follow up w/ patient. Stable for discharge.     Plan  Antithrombotics for secondary stroke prevention: DAPT x30 days then ASA 81 monotherapy there after    Statins for secondary stroke prevention and hyperlipidemia, if present:   Atorvastatin 40 mg Qdaily    Aggressive risk factor modification: HTN     Rehab efforts: The patient has been evaluated by a stroke team provider and the therapy needs have been fully considered based off the presenting complaints and exam findings. The following therapy evaluations are needed: PT evaluate and treat, OT evaluate and treat, SLP evaluate and treat, PM&R evaluate for appropriate placement - home no needs     Diagnostics ordered/pending: NA    VTE prophylaxis: Heparin 5000 units SQ every 8 hours Place SCDs for mechanical ppx.    BP parameters: SBP<180

## 2020-04-22 NOTE — PLAN OF CARE
04/22/20 1308   Final Note   Assessment Type Final Discharge Note   Anticipated Discharge Disposition Home   What phone number can be called within the next 1-3 days to see how you are doing after discharge? [phone]   Hospital Follow Up  Appt(s) scheduled? Yes   Discharge plans and expectations educations in teach back method with documentation complete? Yes   Right Care Referral Info   Post Acute Recommendation No Care     Future Appointments   Date Time Provider Department Center   4/24/2020  9:00 AM [facility]

## 2020-04-22 NOTE — PLAN OF CARE
Plan of care and discharge instructions reviewed with patient.  Education regarding diet, activity and medications reviewed and all questions addressed.  Patient denies pain, no falls or neuro deficits.  Blood pressure closer to desired range.  IV and telemetry discontinued prior to discharge.

## 2020-04-24 ENCOUNTER — OFFICE VISIT (OUTPATIENT)
Dept: FAMILY MEDICINE | Facility: HOSPITAL | Age: 55
End: 2020-04-24
Attending: FAMILY MEDICINE

## 2020-04-24 DIAGNOSIS — I63.9 CEREBROVASCULAR ACCIDENT (CVA), UNSPECIFIED MECHANISM: ICD-10-CM

## 2020-04-24 DIAGNOSIS — I10 ESSENTIAL HYPERTENSION: Primary | ICD-10-CM

## 2020-04-24 DIAGNOSIS — I63.50 RIGHT PONTINE STROKE: ICD-10-CM

## 2020-04-24 DIAGNOSIS — E78.5 HYPERLIPIDEMIA, UNSPECIFIED HYPERLIPIDEMIA TYPE: ICD-10-CM

## 2020-04-24 RX ORDER — CARVEDILOL 25 MG/1
25 TABLET ORAL 2 TIMES DAILY WITH MEALS
Qty: 180 TABLET | Refills: 3 | Status: SHIPPED | OUTPATIENT
Start: 2020-04-24 | End: 2020-08-10

## 2020-04-24 NOTE — PROGRESS NOTES
"Established Patient - Audio Only Telehealth Visit     The patient location is: Home  The chief complaint leading to consultation is: Hospital follow up; Stroke  Visit type: Virtual visit with audio only (telephone)  Call Time: 13 min     The reason for the audio only service rather than synchronous audio and video virtual visit was related to technical difficulties or patient preference/necessity.     Each patient to whom I provide medical services by telemedicine is:  (1) informed of the relationship between the physician and patient and the respective role of any other health care provider with respect to management of the patient; and (2) notified that they may decline to receive medical services by telemedicine and may withdraw from such care at any time. Patient verbally consented to receive this service via voice-only telephone call.    HPI:   Admit Date: 4/20/2020  1:12 PM  Discharge Date and Time:  04/22/2020 2:39 PM     History of Present Illness:     Mr. Tejeda is a 56 yo male w/ PMH significant for HTN who presented to [facility] ED on 4/20.  Stroke code called for concern of LUE, LLE numbness and dysarthria.  In ED, SBP in 220s. No antihypertensives in 2 years. MRI with R pontine infarct. Starting Coreg 6.25 mg BID. Coreg increased to 12.5 BID. No issues w/ the medication. BP under better control and patient was stable for d/c.      Today he reports feeling great. "Just trying to get back to work." Works as a . Denies any continued dysarthria, weakness, numbness.    CVA:  Taking is ASA and Plavix as prescribed without side effect.    HTN:  He did get a blood pressure cuff and has been monitoring at home. Checked it while on the phone with me and the first reading was 187/111 and second reading 190/105. He reports that his numbers have been running 150's/90's at home. Has been taking Coreg 12.5mg BID. Denies any CP, SOB, HA, vision changes.     HLD:  Taking statin as directed.     Diagnostic " Results:     Brain Imaging   MRI brain 4/20  Mild moderate sized signal abnormality lateral aspect the right david as detailed above most suggestive for recent infarction.    Superimposed small to punctate size foci of T2 FLAIR signal hyperintensity elsewhere supratentorial white matter which are nonspecific and may represent age advanced chronic ischemic change.    Clinical correlation and follow-up advised.       Vessel Imaging   MRA brain and neck 4/20  MRA head: Unremarkable MRA of the head specifically without evidence for focal stenosis or definite intracranial aneurysm.    MRA neck: Unremarkable motion distorted MRA of the neck as detailed above without evidence for significant focal stenosis or occlusion.     Cardiac Imaging   TTE 4/21  · Normal left ventricular systolic function. The estimated ejection fraction is 60%.  · Normal LV diastolic function.  · Normal right ventricular systolic function.  · Normal central venous pressure (3 mmHg).    Assessment and plan:   Diagnoses and all orders for this visit:    Essential hypertension   -Increase coreg to 25mg BID and follow up on Monday with BP readings   -Will likely need a second BP agent, unclear why BB as first line  -     carvediloL (COREG) 25 MG tablet; Take 1 tablet (25 mg total) by mouth 2 (two) times daily with meals.    Right pontine stroke  Cerebrovascular accident (CVA), unspecified mechanism   -Plavix and ASA for 30 days followed by ASA only.    -Lifestyle modification; weight loss, exercise, diet   -Non-smoker    Hyperlipidemia, unspecified hyperlipidemia type   -Continue Atorvastatin 40mg daily    Future Appointments   Date Time Provider Department Center   4/27/2020  9:00 AM Luiz Gibbs PA-C [facility]

## 2020-04-27 ENCOUNTER — OFFICE VISIT (OUTPATIENT)
Dept: FAMILY MEDICINE | Facility: HOSPITAL | Age: 55
End: 2020-04-27

## 2020-04-27 DIAGNOSIS — I10 ESSENTIAL HYPERTENSION: Primary | ICD-10-CM

## 2020-04-27 NOTE — PROGRESS NOTES
Established Patient - Audio Only Telehealth Visit     The patient location is: Home  The chief complaint leading to consultation is: BP Follow Up  Visit type: Virtual visit with audio only (telephone)     The reason for the audio only service rather than synchronous audio and video virtual visit was related to technical difficulties or patient preference/necessity.     Each patient to whom I provide medical services by telemedicine is:  (1) informed of the relationship between the physician and patient and the respective role of any other health care provider with respect to management of the patient; and (2) notified that they may decline to receive medical services by telemedicine and may withdraw from such care at any time. Patient verbally consented to receive this service via voice-only telephone call.       HPI:  S/p stroke. Blood pressure at home has been 120s/70s and has only been taking 25mg in the morning and 12.5mg at night, as he was worried that his pressure was too low. Denies any dizziness, lightheadedness, headache, vision changes.      Assessment and plan:     Essential hypertension   -Currently on Coreg 25mg BID (only taking 12.5mg at night). As this seems to be working for him we will continue this treatment until he is seen in the office. He is s/p stroke and BB is not first line for BP control as a single agent we will likely transition him to ACEi/ARB in the future.     Future Appointments   Date Time Provider Department Center   5/27/2020  9:20 AM Luiz Gibbs PA-C [facility]       This service was not originating from a related E/M service provided within the previous 7 days nor will  to an E/M service or procedure within the next 24 hours or my soonest available appointment.  Prevailing standard of care was able to be met in this audio-only visit.

## 2020-05-11 ENCOUNTER — OFFICE VISIT (OUTPATIENT)
Dept: FAMILY MEDICINE | Facility: HOSPITAL | Age: 55
End: 2020-05-11
Attending: FAMILY MEDICINE
Payer: MEDICAID

## 2020-05-11 VITALS
TEMPERATURE: 98 F | WEIGHT: 183.44 LBS | SYSTOLIC BLOOD PRESSURE: 209 MMHG | HEART RATE: 80 BPM | BODY MASS INDEX: 28.79 KG/M2 | HEIGHT: 67 IN | DIASTOLIC BLOOD PRESSURE: 113 MMHG

## 2020-05-11 DIAGNOSIS — E78.5 HYPERLIPIDEMIA, UNSPECIFIED HYPERLIPIDEMIA TYPE: ICD-10-CM

## 2020-05-11 DIAGNOSIS — I10 ESSENTIAL HYPERTENSION: Primary | ICD-10-CM

## 2020-05-11 DIAGNOSIS — I63.50 RIGHT PONTINE STROKE: ICD-10-CM

## 2020-05-11 PROCEDURE — 99213 OFFICE O/P EST LOW 20 MIN: CPT | Performed by: STUDENT IN AN ORGANIZED HEALTH CARE EDUCATION/TRAINING PROGRAM

## 2020-05-11 RX ORDER — VALSARTAN 80 MG/1
80 TABLET ORAL DAILY
Qty: 90 TABLET | Refills: 0 | Status: SHIPPED | OUTPATIENT
Start: 2020-05-11 | End: 2020-05-27 | Stop reason: SDUPTHER

## 2020-05-11 NOTE — PROGRESS NOTES
Subjective:       Patient ID: Teodoro Tejeda is a 55 y.o. male.    Chief Complaint: Hypertension    Patient is a 55 year old male with PMHx of CVA, HTN who presented to [facility]. Patient was recently admitted to [facility] for a small pontine CVA.  Patient has been experiencing paresthesias in his left upper and lower extremity for the previous 3 days prior to presentation to the hospital accordingly he was not a candidate for thrombolysis.  Patient was found to be significantly hypertensive with systolic greater than 200 on admission.  He was allowed to be present miss of the hypertensive for the first admission the started on Coreg 12.5 b.i.d. as well as atorvastatin 40 mg and dual anti-platelet therapy with clopidogrel and aspirin.  Patient discharged on these medications.    Since then, patient reports that his paresthesias have improved back to baseline.  Patient continues to take all his medications.  He was instructed to increase his Coreg to 25 b.i.d. due to repeatedly significantly hypertensive readings of his home blood pressure monitor.  Since then, patient has been getting readings on average around 160/100, but with some readings as high as 190/120 and as low as 140/80.  Today, blood pressure is 209/113.  Patient is denying any symptoms of hypertensive emergency/urgency.  Patient is denying any other complaints at this time.    Review of Systems   Constitutional: Negative for appetite change, chills, fatigue, fever and unexpected weight change.   HENT: Negative for rhinorrhea, sinus pressure, sinus pain, sneezing and sore throat.    Respiratory: Negative for cough, chest tightness, shortness of breath and wheezing.    Cardiovascular: Negative for chest pain and palpitations.   Gastrointestinal: Negative for abdominal distention, abdominal pain, blood in stool, constipation, diarrhea, nausea and vomiting.   Genitourinary: Negative for dysuria, flank pain, frequency, hematuria and urgency.    Musculoskeletal: Negative for arthralgias, back pain and myalgias.   Skin: Negative for color change and rash.   Neurological: Negative for dizziness, weakness, light-headedness, numbness and headaches.   Psychiatric/Behavioral: Negative for behavioral problems, confusion, decreased concentration and sleep disturbance. The patient is not nervous/anxious.        Objective:      Vitals:    05/11/20 1038   BP: (!) 209/113   Pulse: 80   Temp: 97.9 °F (36.6 °C)     Physical Exam   Constitutional: He is oriented to person, place, and time. He appears well-developed and well-nourished. No distress.   HENT:   Head: Normocephalic.   Eyes: Pupils are equal, round, and reactive to light. Conjunctivae and EOM are normal.   Neck: Normal range of motion. Neck supple. No JVD present.   Cardiovascular: Normal rate, regular rhythm, normal heart sounds and intact distal pulses.   No murmur heard.  Pulmonary/Chest: Effort normal and breath sounds normal. No respiratory distress. He has no wheezes.   Abdominal: Soft. Bowel sounds are normal. He exhibits no distension and no mass. There is no tenderness.   Musculoskeletal: Normal range of motion. He exhibits no edema.   Neurological: He is alert and oriented to person, place, and time.   Skin: Skin is warm and dry. Capillary refill takes less than 2 seconds. He is not diaphoretic.   Psychiatric: He has a normal mood and affect. His behavior is normal.       Assessment:       1. Essential hypertension    2. Right pontine stroke    3. Hyperlipidemia, unspecified hyperlipidemia type        Plan:       Essential hypertension  -     valsartan (DIOVAN) 80 MG tablet; Take 1 tablet (80 mg total) by mouth once daily.  Dispense: 90 tablet; Refill: 0  - patient continues to have significantly poorly controlled hypertension.  Blood pressure is averaging somewhere around 160/100.  He is currently just on carvedilol 25 b.i.d..  Will start on valsartan for better blood care pressure control and some  kidney protection as his estimated GFR is around low 60s.  Instructed patient to continue to maintain low-salt diet and continue to check his blood pressure and bring blood pressure readings to next visit.    Right pontine stroke         -      patient is status post right pontine stroke about 3 weeks ago.  Currently on dual anti-platelet therapy with aspirin and clopidogrel.  Instructed patient to continue aspirin and clopidogrel until 30 days have elapsed and then he can stop taking the clopidogrel but to continue taking the aspirin.  Patient denying any continued symptoms of the paresthesias that caused his presentation to the hospital.    Hyperlipidemia, unspecified hyperlipidemia type         -     continue current medical management with atorvastatin.    Follow up in about 4 weeks (around 6/8/2020).      Rip Chappell MD PGY-1  [facility]   05/11/2020 11:44 AM    This note was partially created using [employer] Voice Recognition software. There may be occasional misinterpreted words, typos, or grammatical errors that were not caught upon review.

## 2020-05-27 ENCOUNTER — OFFICE VISIT (OUTPATIENT)
Dept: FAMILY MEDICINE | Facility: HOSPITAL | Age: 55
End: 2020-05-27
Attending: FAMILY MEDICINE
Payer: MEDICAID

## 2020-05-27 VITALS
WEIGHT: 185.63 LBS | SYSTOLIC BLOOD PRESSURE: 206 MMHG | HEART RATE: 86 BPM | BODY MASS INDEX: 29.13 KG/M2 | DIASTOLIC BLOOD PRESSURE: 118 MMHG | HEIGHT: 67 IN

## 2020-05-27 DIAGNOSIS — E78.5 HYPERLIPIDEMIA, UNSPECIFIED HYPERLIPIDEMIA TYPE: ICD-10-CM

## 2020-05-27 DIAGNOSIS — I10 ESSENTIAL HYPERTENSION: Primary | ICD-10-CM

## 2020-05-27 PROCEDURE — 99213 OFFICE O/P EST LOW 20 MIN: CPT | Performed by: PHYSICIAN ASSISTANT

## 2020-05-27 RX ORDER — VALSARTAN 80 MG/1
160 TABLET ORAL DAILY
Qty: 90 TABLET | Refills: 3 | Status: SHIPPED | OUTPATIENT
Start: 2020-05-27 | End: 2020-05-27

## 2020-05-27 RX ORDER — ATORVASTATIN CALCIUM 40 MG/1
40 TABLET, FILM COATED ORAL DAILY
Qty: 90 TABLET | Refills: 3 | Status: SHIPPED | OUTPATIENT
Start: 2020-05-27 | End: 2021-05-27

## 2020-05-27 RX ORDER — VALSARTAN 80 MG/1
80 TABLET ORAL 2 TIMES DAILY
Qty: 90 TABLET | Refills: 3 | Status: SHIPPED | OUTPATIENT
Start: 2020-05-27 | End: 2020-06-03 | Stop reason: SDUPTHER

## 2020-05-27 NOTE — PROGRESS NOTES
Subjective:       Patient ID: Teodoro Teejda is a 55 y.o. male.    Chief Complaint: Hypertension    With PMHx of CVA, HTN who is here for BP follow up. I had a virtual hospital follow up with the patient one month ago s/p admission for CVA. He was started only on BB at that, but reported normal/low BP readings at home. He established care in our clinic 2 weeks ago with Dr. Chappell. At that time his BP was quite elevated and he was started Valsartan 80mg.    HTN:  Today his BP is still very elevated at 206/118 (Repeat 192/115). He has been taking all of his medications as prescribed and has been monitoring his BP at home. In the morning he has been seeing elevated numbers (similar to the clinic readings), but in the afternoon it is running 130-140s/80-90s. Patient is denying CP, SOB, HA, vision changes, or any other symptoms of hypertensive emergency/urgency.    CVA:  Completed one month of Plavix and will now continue 81mg ASA daily. Denies continued symptoms or deficits.     Review of Systems   Constitutional: Negative.    HENT: Negative.    Respiratory: Negative.    Cardiovascular: Negative.    Gastrointestinal: Negative.    Endocrine: Negative.    Genitourinary: Negative.    Musculoskeletal: Negative.    Skin: Negative.    Allergic/Immunologic: Negative.    Neurological: Negative.    Psychiatric/Behavioral: Negative.        Objective:      Vitals:    05/27/20 0933   BP: (!) 206/118   Pulse: 86     Physical Exam   Constitutional: He is oriented to person, place, and time. He appears well-developed and well-nourished. No distress.   HENT:   Head: Normocephalic and atraumatic.   Eyes: Conjunctivae and EOM are normal.   Neck: No tracheal deviation present.   Cardiovascular: Normal rate.   Pulmonary/Chest: Effort normal. No respiratory distress.   Musculoskeletal: Normal range of motion. He exhibits no edema, tenderness or deformity.   Neurological: He is alert and oriented to person, place, and time. Coordination  normal.   Skin: Skin is warm and dry. He is not diaphoretic.   Psychiatric: He has a normal mood and affect. His behavior is normal. Thought content normal.   Nursing note and vitals reviewed.      Assessment:       1. Essential hypertension    2. Hyperlipidemia, unspecified hyperlipidemia type        Plan:       Essential hypertension   -Today we will double his valsartan to 160mg daily and also dose it BID. He is reporting that his BP is elevated in the morning and normalizes by mid day when he checks it again. Will follow up in 1 week for BP check. If no improvement would consider adding diuretic and possibly changing medications completely.     -     Discontinue: valsartan (DIOVAN) 80 MG tablet; Take 2 tablets (160 mg total) by mouth once daily.  Dispense: 90 tablet; Refill: 3  -     valsartan (DIOVAN) 80 MG tablet; Take 1 tablet (80 mg total) by mouth 2 (two) times daily.  Dispense: 90 tablet; Refill: 3    Hyperlipidemia, unspecified hyperlipidemia type  -     atorvastatin (LIPITOR) 40 MG tablet; Take 1 tablet (40 mg total) by mouth once daily.  Dispense: 90 tablet; Refill: 3    Greater than 30 min were spent with this patient. More than half of that time was spent discussing medication changes, effects of uncontrolled hypertension, blood pressure goals, as well as when and how to properly measure blood pressure at home.     Future Appointments   Date Time Provider Department Center   6/3/2020  9:40 AM Luiz Gibbs PA-C [facility]

## 2020-06-03 ENCOUNTER — OFFICE VISIT (OUTPATIENT)
Dept: FAMILY MEDICINE | Facility: HOSPITAL | Age: 55
End: 2020-06-03
Attending: FAMILY MEDICINE
Payer: MEDICAID

## 2020-06-03 VITALS
DIASTOLIC BLOOD PRESSURE: 121 MMHG | WEIGHT: 186.94 LBS | SYSTOLIC BLOOD PRESSURE: 203 MMHG | HEIGHT: 67 IN | HEART RATE: 84 BPM | BODY MASS INDEX: 29.34 KG/M2

## 2020-06-03 DIAGNOSIS — I10 ESSENTIAL HYPERTENSION: Primary | ICD-10-CM

## 2020-06-03 PROCEDURE — 99213 OFFICE O/P EST LOW 20 MIN: CPT | Performed by: PHYSICIAN ASSISTANT

## 2020-06-03 RX ORDER — VALSARTAN 80 MG/1
160 TABLET ORAL 2 TIMES DAILY
Qty: 90 TABLET | Refills: 3
Start: 2020-06-03 | End: 2020-06-25 | Stop reason: SDUPTHER

## 2020-06-03 NOTE — PROGRESS NOTES
Subjective:       Patient ID: Teodoro Tejeda is a 55 y.o. male.    Chief Complaint: Hypertension    With PMHx of CVA, HTN who is here for BP follow up. I had a virtual hospital follow up with the patient one month ago s/p admission for CVA. He was started only on BB at that, but reported normal/low BP readings at home. He established care in our clinic 4 weeks ago with Dr. Chappell. At that time his BP was quite elevated and he was started Valsartan 80mg, which I increased to 160mg 1 week ago.    HTN:  Today his BP is still very elevated at 203/121. He has been taking all of his medications as prescribed and has been monitoring his BP at home. In the morning he has been seeing elevated numbers (similar to the clinic readings), but in the afternoon it is running 130-140s/80-90s. He brought his cuff and log today for review. Patient is denying CP, SOB, HA, vision changes, or any other symptoms of hypertensive emergency/urgency.    CVA:  Completed one month of Plavix and will now continue 81mg ASA daily. Denies continued symptoms or deficits.     Review of Systems   Constitutional: Negative.    HENT: Negative.    Respiratory: Negative.    Cardiovascular: Negative.    Gastrointestinal: Negative.    Endocrine: Negative.    Genitourinary: Negative.    Musculoskeletal: Negative.    Skin: Negative.    Allergic/Immunologic: Negative.    Neurological: Negative.    Psychiatric/Behavioral: Negative.        Objective:      Vitals:    06/03/20 0941   BP: (!) 203/121   Pulse: 84     Physical Exam   Constitutional: He is oriented to person, place, and time. He appears well-developed and well-nourished. No distress.   HENT:   Head: Normocephalic and atraumatic.   Eyes: Conjunctivae and EOM are normal.   Neck: No tracheal deviation present.   Cardiovascular: Normal rate.   Pulmonary/Chest: Effort normal. No respiratory distress.   Musculoskeletal: Normal range of motion. He exhibits no edema, tenderness or deformity.   Neurological:  He is alert and oriented to person, place, and time. Coordination normal.   Skin: Skin is warm and dry. He is not diaphoretic.   Psychiatric: He has a normal mood and affect. His behavior is normal. Thought content normal.   Nursing note and vitals reviewed.      Assessment:       1. Essential hypertension        Plan:       Essential hypertension   -BP still elevated today, but on repeat it was improved to 170s/100s and he denies any symptoms at this time. He will likely need a third agent, but today we will max out his valsartan at 320mg daily divided into two doses. Considering his elevated AM BP readings would like to get sleep study, but patient is currently uninsured.    -Will consider diuretic at next visit if still elevated. Also, consider addition of buspar for BP elevation related to anxiety.     -     valsartan (DIOVAN) 80 MG tablet; Take 2 tablets (160 mg total) by mouth 2 (two) times daily.  Dispense: 90 tablet; Refill: 3      Future Appointments   Date Time Provider Department Center   6/10/2020  9:40 AM Luiz Gibbs PA-C [facility]

## 2020-06-10 ENCOUNTER — OFFICE VISIT (OUTPATIENT)
Dept: FAMILY MEDICINE | Facility: HOSPITAL | Age: 55
End: 2020-06-10
Attending: FAMILY MEDICINE
Payer: MEDICAID

## 2020-06-10 VITALS
HEART RATE: 91 BPM | SYSTOLIC BLOOD PRESSURE: 162 MMHG | BODY MASS INDEX: 29.45 KG/M2 | DIASTOLIC BLOOD PRESSURE: 98 MMHG | HEIGHT: 67 IN | WEIGHT: 187.63 LBS

## 2020-06-10 DIAGNOSIS — I10 ESSENTIAL HYPERTENSION: Primary | ICD-10-CM

## 2020-06-10 DIAGNOSIS — I1A.0 RESISTANT HYPERTENSION: ICD-10-CM

## 2020-06-10 DIAGNOSIS — R40.0 DAYTIME SLEEPINESS: ICD-10-CM

## 2020-06-10 DIAGNOSIS — R06.83 LOUD SNORING: ICD-10-CM

## 2020-06-10 PROCEDURE — 99214 OFFICE O/P EST MOD 30 MIN: CPT | Performed by: PHYSICIAN ASSISTANT

## 2020-06-10 RX ORDER — HYDROCHLOROTHIAZIDE 25 MG/1
25 TABLET ORAL DAILY
Qty: 90 TABLET | Refills: 3 | Status: SHIPPED | OUTPATIENT
Start: 2020-06-10 | End: 2020-06-25 | Stop reason: SDUPTHER

## 2020-06-10 RX ORDER — BUSPIRONE HYDROCHLORIDE 10 MG/1
10 TABLET ORAL 3 TIMES DAILY
Qty: 90 TABLET | Refills: 11 | Status: SHIPPED | OUTPATIENT
Start: 2020-06-10 | End: 2021-06-10

## 2020-06-10 NOTE — PROGRESS NOTES
Subjective:       Patient ID: Teodoro Tejeda is a 55 y.o. male.    Chief Complaint: Hypertension    With PMHx of CVA, HTN who is here for BP follow up. I had a virtual hospital follow up with the patient one month ago s/p admission for CVA. He was started only on BB at that, but reported normal/low BP readings at home. He established care in our clinic ~6 weeks ago with Dr. Chappell. At that time his BP was quite elevated and he was started Valsartan 80mg, which I have now increased to 160mg BID.    HTN:  Today his BP is still very elevated at 199/108 (much improved on repeat, but still elevated). He has been taking all of his medications as prescribed and has been monitoring his BP at home. In the morning he has been seeing elevated numbers (similar to the clinic readings), but in the afternoon it is running 130-140s/80-90s. He brought his cuff and log today for review. Patient is denying CP, SOB, HA, vision changes, or any other symptoms of hypertensive emergency/urgency.    CVA:  Completed one month of Plavix and will now continue 81mg ASA daily. Denies continued symptoms or deficits.     Possible WANDA:  Considering his resistant HTN I would like to get a sleep study. Patient does endorse snoring (reported by wife) and daytime sleepiness. STOP-BANG score of 4 which puts him at moderate risk.     Review of Systems   Constitutional: Positive for fatigue.        Daytime sleepiness   HENT: Negative.    Respiratory: Negative.  Negative for cough, choking, chest tightness and shortness of breath.         Snoring   Cardiovascular: Negative.    Gastrointestinal: Negative.    Endocrine: Negative.    Genitourinary: Negative.    Musculoskeletal: Negative.    Skin: Negative.    Allergic/Immunologic: Negative.    Neurological: Negative.    Psychiatric/Behavioral: Positive for sleep disturbance.       Objective:      Vitals:    06/10/20 1009   BP: (!) 162/98   Pulse:      Physical Exam  Vitals signs and nursing note reviewed.    Constitutional:       General: He is not in acute distress.     Appearance: He is well-developed. He is not diaphoretic.   HENT:      Head: Normocephalic and atraumatic.   Eyes:      Conjunctiva/sclera: Conjunctivae normal.   Neck:      Trachea: No tracheal deviation.   Cardiovascular:      Rate and Rhythm: Normal rate.   Pulmonary:      Effort: Pulmonary effort is normal. No respiratory distress.   Musculoskeletal: Normal range of motion.         General: No tenderness or deformity.   Skin:     General: Skin is warm and dry.   Neurological:      Mental Status: He is alert and oriented to person, place, and time.      Coordination: Coordination normal.   Psychiatric:         Behavior: Behavior normal.         Thought Content: Thought content normal.         Assessment:       1. Essential hypertension    2. Daytime sleepiness    3. Loud snoring    4. Resistant hypertension        Plan:       Essential hypertension   -Will add thiazide to his current medications, along with Buspar for possible anxiety/white coat HTN and he will follow up with me in 1 week to assess efficacy.    -No symptoms today, but given strict ED precautions should he begin to experience any CP, SOB, headaches, vision changes, numbness/weakness.   -     hydroCHLOROthiazide (HYDRODIURIL) 25 MG tablet; Take 1 tablet (25 mg total) by mouth once daily.  Dispense: 90 tablet; Refill: 3  -     busPIRone (BUSPAR) 10 MG tablet; Take 1 tablet (10 mg total) by mouth 3 (three) times daily.  Dispense: 90 tablet; Refill: 11    Daytime sleepiness  -     Polysomnogram (CPAP will be added if patient meets diagnostic criteria.); Future; Expected date: 06/17/2020    Loud snoring  -     Polysomnogram (CPAP will be added if patient meets diagnostic criteria.); Future; Expected date: 06/17/2020    Resistant hypertension  -     Polysomnogram (CPAP will be added if patient meets diagnostic criteria.); Future; Expected date: 06/17/2020      Future Appointments   Date  Time Provider Department Center   6/17/2020 11:00 AM Luiz Gibbs PA-C [facility]       Follow up in about 1 week (around 6/17/2020).

## 2020-06-17 ENCOUNTER — OFFICE VISIT (OUTPATIENT)
Dept: FAMILY MEDICINE | Facility: HOSPITAL | Age: 55
End: 2020-06-17
Attending: FAMILY MEDICINE
Payer: MEDICAID

## 2020-06-17 VITALS
DIASTOLIC BLOOD PRESSURE: 99 MMHG | HEIGHT: 67 IN | WEIGHT: 185.19 LBS | BODY MASS INDEX: 29.07 KG/M2 | SYSTOLIC BLOOD PRESSURE: 168 MMHG | HEART RATE: 72 BPM

## 2020-06-17 DIAGNOSIS — I10 ESSENTIAL HYPERTENSION: Primary | ICD-10-CM

## 2020-06-17 PROCEDURE — 99214 OFFICE O/P EST MOD 30 MIN: CPT | Performed by: PHYSICIAN ASSISTANT

## 2020-06-17 NOTE — PROGRESS NOTES
Subjective:       Patient ID: Teodoro Tejeda is a 55 y.o. male.    Chief Complaint: Hypertension    With PMHx of CVA, HTN who is here for BP follow up. I had a virtual hospital follow up with the patient one month ago s/p admission for CVA. He was started only on BB at that, but reported normal/low BP readings at home. He established care in our clinic ~6 weeks ago with Dr. Chappell. At that time his BP was quite elevated and he was started Valsartan 80mg, which I have now increased to 160mg BID.    HTN:  Today his BP is still elevated at 168/99 (much improved on repeat, but still elevated). He has been taking all of his medications as prescribed and has been monitoring his BP at home. In the morning he has been seeing mildly elevated numbers (similar to the clinic readings), but in the afternoon it is running 120-140s/70-90s. He brought his log today for review. Cuff was verified at last visit. Patient is denying CP, SOB, HA, vision changes, or any other symptoms of hypertensive emergency/urgency.    CVA:  Completed one month of Plavix and will now continue 81mg ASA daily. Denies continued symptoms or deficits.     Possible WANDA:  Considering his resistant HTN I would like to get a sleep study. Patient does endorse snoring (reported by wife) and daytime sleepiness. STOP-BANG score of 4 which puts him at moderate risk.     Review of Systems   Constitutional: Positive for fatigue.        Daytime sleepiness   HENT: Negative.    Respiratory: Negative.  Negative for cough, choking, chest tightness and shortness of breath.         Snoring   Cardiovascular: Negative.    Gastrointestinal: Negative.    Endocrine: Negative.    Genitourinary: Negative.    Musculoskeletal: Negative.    Skin: Negative.    Allergic/Immunologic: Negative.    Neurological: Negative.    Psychiatric/Behavioral: Positive for sleep disturbance.       Objective:      Vitals:    06/17/20 1108   BP: (!) 168/99   Pulse: 72     Physical Exam  Vitals signs  and nursing note reviewed.   Constitutional:       General: He is not in acute distress.     Appearance: He is well-developed. He is not diaphoretic.   HENT:      Head: Normocephalic and atraumatic.   Eyes:      Conjunctiva/sclera: Conjunctivae normal.   Neck:      Trachea: No tracheal deviation.   Cardiovascular:      Rate and Rhythm: Normal rate.   Pulmonary:      Effort: Pulmonary effort is normal. No respiratory distress.   Musculoskeletal: Normal range of motion.         General: No tenderness or deformity.   Skin:     General: Skin is warm and dry.   Neurological:      Mental Status: He is alert and oriented to person, place, and time.      Coordination: Coordination normal.   Psychiatric:         Behavior: Behavior normal.         Thought Content: Thought content normal.         Assessment:       1. Essential hypertension        Plan:       Essential hypertension   -Considering he has only been taking hctz for a couple days we will continue his current medications at this time. His home readings are much improved from prior week, but still elevated. His BP is also still quite elevated today at triage. At this time he is is taking valsartan 320mg, coreg 25mg BID, and hctz 25mg daily. If continued elevated we can considering Exforge HCT at follow up visit.     Future Appointments   Date Time Provider Department Center   7/1/2020 10:40 AM Luiz Gibbs PA-C [facility] [place] [facility]\""

## 2020-06-24 DIAGNOSIS — I10 ESSENTIAL HYPERTENSION: ICD-10-CM

## 2020-06-24 RX ORDER — VALSARTAN 80 MG/1
160 TABLET ORAL DAILY
Qty: 90 TABLET | Refills: 3 | Status: CANCELLED | OUTPATIENT
Start: 2020-06-24 | End: 2021-06-24

## 2020-06-25 DIAGNOSIS — I10 ESSENTIAL HYPERTENSION: Primary | ICD-10-CM

## 2020-06-25 DIAGNOSIS — I10 ESSENTIAL HYPERTENSION: ICD-10-CM

## 2020-06-25 RX ORDER — AMLODIPINE, VALSARTAN AND HYDROCHLOROTHIAZIDE 10; 320; 25 MG/1; MG/1; MG/1
1 TABLET ORAL DAILY
Qty: 90 TABLET | Refills: 3 | Status: SHIPPED | OUTPATIENT
Start: 2020-06-25 | End: 2021-06-25

## 2020-06-25 RX ORDER — VALSARTAN 80 MG/1
160 TABLET ORAL DAILY
Qty: 90 TABLET | Refills: 3 | Status: CANCELLED | OUTPATIENT
Start: 2020-06-24 | End: 2021-06-24

## 2020-06-25 NOTE — PROGRESS NOTES
Spoke to Mr. Tejeda and the pharmacy. BP still running a bit high at home. We will change his BP medication to Exforge HCT and d/c everything else. He will continue to monitor at home and follow up in clinic next week.

## 2020-06-25 NOTE — TELEPHONE ENCOUNTER
----- Message from Lina Blood MA sent at 6/25/2020  1:41 PM CDT -----  Regarding: Call back  Contact: [facility]; [address]uis [phone]  Pharmacy requests that you call back again as you want to speak with the patient before he leaves the pharmacy.  Thanks.

## 2020-06-25 NOTE — TELEPHONE ENCOUNTER
----- Message from Lina Blood MA sent at 6/24/2020 12:49 PM CDT -----  Regarding: Rx.  Contact: Patient [phone]  Patient requests refill on valsartin 80; please send to [facility].  Thanks.

## 2020-07-01 ENCOUNTER — OFFICE VISIT (OUTPATIENT)
Dept: FAMILY MEDICINE | Facility: HOSPITAL | Age: 55
End: 2020-07-01
Attending: FAMILY MEDICINE
Payer: MEDICAID

## 2020-07-01 VITALS — SYSTOLIC BLOOD PRESSURE: 187 MMHG | DIASTOLIC BLOOD PRESSURE: 100 MMHG

## 2020-07-01 DIAGNOSIS — I63.9 CEREBROVASCULAR ACCIDENT (CVA), UNSPECIFIED MECHANISM: ICD-10-CM

## 2020-07-01 DIAGNOSIS — I10 ESSENTIAL HYPERTENSION: Primary | ICD-10-CM

## 2020-07-01 DIAGNOSIS — E78.2 MIXED HYPERLIPIDEMIA: ICD-10-CM

## 2020-07-01 PROCEDURE — 99213 OFFICE O/P EST LOW 20 MIN: CPT | Performed by: PHYSICIAN ASSISTANT

## 2020-07-08 NOTE — PROGRESS NOTES
Subjective:       Patient ID: Teodoro Tejeda is a 55 y.o. male.    Chief Complaint: Follow-up    With PMHx of CVA, HTN who is here for BP follow up. I had a virtual hospital follow s/p admission for CVA. He was started only on BB at that, but reported normal/low BP readings at home. He established care in our clinic with Dr. Chapepll. At that time his BP was quite elevated and he was started Valsartan 80mg, which we eventually maxed without good response. Changed to ExforgeHCT last week.    HTN:  Today his BP is still elevated on presentation at 187/100, but on repeat 147/95. He only began taking ExforgeHCT 5 days ago and has been monitoring his BP at home. He brought his log today for review showing BPs mostly in the normotensive range. Cuff was verified at last visit. Patient is denying CP, SOB, HA, vision changes, or any other symptoms of hypertensive emergency/urgency.    CVA:  Completed one month of Plavix and will now continue 81mg ASA daily. Denies continued symptoms or deficits.     Possible WANDA:  Sleep study ordered at last visit. Patient does endorse snoring (reported by wife) and daytime sleepiness. STOP-BANG score of 4 which puts him at moderate risk.     Review of Systems   Constitutional: Positive for fatigue.        Daytime sleepiness   HENT: Negative.    Respiratory: Negative.  Negative for cough, choking, chest tightness and shortness of breath.         Snoring   Cardiovascular: Negative.    Gastrointestinal: Negative.    Endocrine: Negative.    Genitourinary: Negative.    Musculoskeletal: Negative.    Skin: Negative.    Allergic/Immunologic: Negative.    Neurological: Negative.    Psychiatric/Behavioral: Positive for sleep disturbance.       Objective:      Vitals:    07/01/20 1042   BP: (!) 187/100     Physical Exam  Vitals signs and nursing note reviewed.   Constitutional:       General: He is not in acute distress.     Appearance: He is well-developed. He is not diaphoretic.   HENT:      Head:  Normocephalic and atraumatic.   Eyes:      Conjunctiva/sclera: Conjunctivae normal.   Neck:      Trachea: No tracheal deviation.   Cardiovascular:      Rate and Rhythm: Normal rate.   Pulmonary:      Effort: Pulmonary effort is normal. No respiratory distress.   Musculoskeletal: Normal range of motion.         General: No tenderness or deformity.   Skin:     General: Skin is warm and dry.   Neurological:      Mental Status: He is alert and oriented to person, place, and time.      Coordination: Coordination normal.   Psychiatric:         Behavior: Behavior normal.         Thought Content: Thought content normal.         Assessment:       1. Essential hypertension    2. Mixed hyperlipidemia    3. Cerebrovascular accident (CVA), unspecified mechanism        Plan:       Essential hypertension   -BP under much better control today with ExforgeHCT. Will continue at this time. He will continue to monitor at home and bring log to follow up visit. If still elevated we can add his Coreg back to his daily regimen. Advised on low salt/DASH diet and increased exercise to help lower his BP without additional medication.     Mixed hyperlipidemia   -Continue statin    Cerebrovascular accident (CVA), unspecified mechanism   -Continue ASA and statin. Stable, mild continued weakness    Future Appointments   Date Time Provider Department Center   8/10/2020  1:20 PM [facility]

## 2020-07-28 ENCOUNTER — TELEPHONE (OUTPATIENT)
Dept: FAMILY MEDICINE | Facility: HOSPITAL | Age: 55
End: 2020-07-28

## 2020-07-28 NOTE — TELEPHONE ENCOUNTER
----- Message from Ramya Yung sent at 7/28/2020  9:11 AM CDT -----  Pt would like to speak to dr in regards to his amLODIPine-valsartan-hcthiazid (EXFORGE HCT) -25 mg Tab making him dizzy and he's got a buzz on his ear since he started taking it. Please call pt back at [phone]

## 2020-08-10 ENCOUNTER — OFFICE VISIT (OUTPATIENT)
Dept: FAMILY MEDICINE | Facility: HOSPITAL | Age: 55
End: 2020-08-10
Attending: FAMILY MEDICINE
Payer: MEDICAID

## 2020-08-10 VITALS
BODY MASS INDEX: 29.83 KG/M2 | SYSTOLIC BLOOD PRESSURE: 157 MMHG | DIASTOLIC BLOOD PRESSURE: 100 MMHG | HEART RATE: 115 BPM | HEIGHT: 67 IN | WEIGHT: 190.06 LBS

## 2020-08-10 DIAGNOSIS — H93.12 TINNITUS OF LEFT EAR: ICD-10-CM

## 2020-08-10 DIAGNOSIS — Z11.59 NEED FOR HEPATITIS C SCREENING TEST: ICD-10-CM

## 2020-08-10 DIAGNOSIS — Z11.4 SCREENING FOR HUMAN IMMUNODEFICIENCY VIRUS: ICD-10-CM

## 2020-08-10 DIAGNOSIS — I10 ESSENTIAL HYPERTENSION: Primary | ICD-10-CM

## 2020-08-10 PROCEDURE — 99213 OFFICE O/P EST LOW 20 MIN: CPT | Performed by: STUDENT IN AN ORGANIZED HEALTH CARE EDUCATION/TRAINING PROGRAM

## 2020-08-11 NOTE — PROGRESS NOTES
Subjective:       Patient ID: Teodoro Tejeda is a 55 y.o. male.    Chief Complaint: Follow-up and Tinnitus    Patient is a 55 y.o. male with PMHx of CVA, HTN who presented for followup.  Patient reports that his blood pressure is largely well controlled at home.  In he is getting values averaging around 130/80 (please see below for home BP log).  Patient reports good compliance with amlodipine/valsartan/HCTZ combined pill.  Patient denies any side effects.    Patient also complaining of ringing in his left ear.  This has been present since July 25th, about 2 and half weeks.  Patient only ever experiences ringing in his left ear never in the right.  Patient reports that is constant throughout the day but especially worse when he is in a silent place.  Patient reports that the ringing sound similar to the air being without of a tire.  Patient reports that he can not hear the ringing when he is watching TV so he frequently leaves the TV on to drown out the ringing.  Patient denies any associated symptoms including but not limited to dizziness, lightheadedness, headache, blurry vision, chest pain, shortness of breath, etc..    Review of Systems   Constitutional: Negative for appetite change, chills, fatigue, fever and unexpected weight change.   HENT: Positive for tinnitus (L). Negative for rhinorrhea, sinus pressure, sinus pain, sneezing and sore throat.    Respiratory: Negative for cough, chest tightness, shortness of breath and wheezing.    Cardiovascular: Negative for chest pain and palpitations.   Gastrointestinal: Negative for abdominal distention, abdominal pain, blood in stool, constipation, diarrhea, nausea and vomiting.   Genitourinary: Negative for dysuria, flank pain, frequency, hematuria and urgency.   Musculoskeletal: Negative for arthralgias, back pain and myalgias.   Skin: Negative for color change and rash.   Neurological: Negative for dizziness, weakness, light-headedness, numbness and headaches.    Psychiatric/Behavioral: Negative for behavioral problems, confusion, decreased concentration and sleep disturbance. The patient is not nervous/anxious.        Objective:      Vitals:    08/10/20 1328   BP: (!) 157/100   Pulse: (!) 115     Physical Exam  Constitutional:       General: He is not in acute distress.     Appearance: Normal appearance. He is well-developed. He is not diaphoretic.   HENT:      Head: Normocephalic and atraumatic.      Right Ear: Tympanic membrane, ear canal and external ear normal.      Left Ear: Tympanic membrane normal.      Ears:      Comments: Some cerumen in L ear  Eyes:      Conjunctiva/sclera: Conjunctivae normal.      Pupils: Pupils are equal, round, and reactive to light.   Neck:      Musculoskeletal: Normal range of motion and neck supple.      Vascular: No JVD.   Cardiovascular:      Rate and Rhythm: Normal rate and regular rhythm.      Pulses: Normal pulses.      Heart sounds: Normal heart sounds. No murmur.   Pulmonary:      Effort: Pulmonary effort is normal. No respiratory distress.      Breath sounds: Normal breath sounds. No wheezing.   Abdominal:      General: Bowel sounds are normal. There is no distension.      Palpations: Abdomen is soft. There is no mass.      Tenderness: There is no abdominal tenderness.   Musculoskeletal: Normal range of motion.         General: No swelling.   Skin:     General: Skin is warm and dry.      Capillary Refill: Capillary refill takes less than 2 seconds.   Neurological:      General: No focal deficit present.      Mental Status: He is alert and oriented to person, place, and time. Mental status is at baseline.   Psychiatric:         Mood and Affect: Mood normal.         Behavior: Behavior normal.                 Assessment:       1. Essential hypertension    2. Tinnitus of left ear    3. Screening for human immunodeficiency virus    4. Need for hepatitis C screening test        Plan:       Essential hypertension  -     Comprehensive  metabolic panel; Future; Expected date: 08/10/2020  - Based on home logs, BP is well controlled on current medications.  Likely that patient is suffering from some degree of white coat hypertension.  Continue current regimen.    Tinnitus of left ear         -     patient with complaints of ringing of left ear for the past 2 and half weeks.  On physical exam, some cerumen in left ear canal although unclear if whether impacted or not.  Tympanic membrane appeared clear as what was visible.  Will attempt to treat with over-the-counter otic solution as patient felt some relief with homeopathic fluid drops in the year.  If symptoms do not improve, likely that patient has true tinnitus and will pursue further treatment as appropriate.    Screening for human immunodeficiency virus  -     HIV 1/2 Ag/Ab (4th Gen); Future; Expected date: 08/10/2020    Need for hepatitis C screening test  -     Hepatitis Panel, Acute; Future; Expected date: 08/10/2020      Follow up in about 4 weeks (around 9/7/2020).      Rip Chappell MD PGY-2  [facility]   08/10/2020 7:18 PM    This note was partially created using [employer] Voice Recognition software. Typographical and content errors may occur with this process. While efforts are made to detect and correct such errors, in some cases errors will persist. For this reason, wording in this document should be considered in the proper context and not strictly verbatim

## 2021-01-07 RX ORDER — ASPIRIN 81 MG/1
81 TABLET ORAL DAILY
Qty: 360 TABLET | Refills: 0 | Status: SHIPPED | OUTPATIENT
Start: 2021-01-07 | End: 2022-01-07

## 2021-04-12 NOTE — ASSESSMENT & PLAN NOTE
Form received in Medical Release for processing.  Please note that it takes 7-10 business days for completion.    Received signed authorization.    Stroke risk factor    Atorvastatin 40 mg